# Patient Record
Sex: MALE | Race: WHITE | NOT HISPANIC OR LATINO | Employment: UNEMPLOYED | URBAN - METROPOLITAN AREA
[De-identification: names, ages, dates, MRNs, and addresses within clinical notes are randomized per-mention and may not be internally consistent; named-entity substitution may affect disease eponyms.]

---

## 2023-01-17 PROBLEM — R49.0 HOARSENESS OF VOICE: Status: ACTIVE | Noted: 2023-01-17

## 2023-01-17 PROBLEM — G47.00 INSOMNIA: Status: ACTIVE | Noted: 2023-01-17

## 2023-01-17 PROBLEM — J34.2 DEVIATED NASAL SEPTUM: Status: ACTIVE | Noted: 2023-01-17

## 2023-01-17 PROBLEM — M25.641 STIFFNESS OF FINGER JOINT OF RIGHT HAND: Status: ACTIVE | Noted: 2023-01-17

## 2023-01-17 PROBLEM — S52.501A CLOSED FRACTURE OF RIGHT DISTAL RADIUS: Status: ACTIVE | Noted: 2023-01-17

## 2023-01-17 PROBLEM — R31.9 HEMATURIA: Status: ACTIVE | Noted: 2023-01-17

## 2023-01-17 PROBLEM — M25.539 PAIN IN WRIST: Status: ACTIVE | Noted: 2023-01-17

## 2023-01-17 PROBLEM — I25.10 CAD (CORONARY ARTERY DISEASE): Status: ACTIVE | Noted: 2023-01-17

## 2023-01-17 PROBLEM — J38.3 VOCAL CORD CYST: Status: ACTIVE | Noted: 2023-01-17

## 2023-01-17 PROBLEM — N52.9 ERECTILE DYSFUNCTION: Status: ACTIVE | Noted: 2023-01-17

## 2023-01-17 PROBLEM — Z95.1 S/P CABG X 3: Status: ACTIVE | Noted: 2023-01-17

## 2023-01-17 PROBLEM — N40.1 BENIGN PROSTATIC HYPERPLASIA WITH LOWER URINARY TRACT SYMPTOMS: Status: ACTIVE | Noted: 2023-01-17

## 2023-01-17 PROBLEM — S92.151A CLOSED DISPLACED AVULSION FRACTURE OF RIGHT TALUS: Status: ACTIVE | Noted: 2023-01-17

## 2023-01-17 PROBLEM — M25.631 STIFFNESS OF RIGHT WRIST JOINT: Status: ACTIVE | Noted: 2023-01-17

## 2023-01-17 PROBLEM — J34.3 NASAL TURBINATE HYPERTROPHY: Status: ACTIVE | Noted: 2023-01-17

## 2023-01-17 PROBLEM — J38.1 LARYNGEAL POLYP: Status: ACTIVE | Noted: 2023-01-17

## 2023-01-17 PROBLEM — J34.89 NASAL OBSTRUCTION: Status: ACTIVE | Noted: 2023-01-17

## 2023-01-17 RX ORDER — SILDENAFIL 100 MG/1
100 TABLET, FILM COATED ORAL AS NEEDED
COMMUNITY
Start: 2021-07-19 | End: 2023-03-05 | Stop reason: WASHOUT

## 2023-01-17 RX ORDER — TAMSULOSIN HYDROCHLORIDE 0.4 MG/1
0.4 CAPSULE ORAL NIGHTLY
COMMUNITY
Start: 2021-10-06 | End: 2023-10-30 | Stop reason: ALTCHOICE

## 2023-01-17 RX ORDER — TADALAFIL 10 MG/1
10 TABLET ORAL ONCE AS NEEDED
COMMUNITY
Start: 2021-10-06

## 2023-01-17 RX ORDER — CLOBETASOL PROPIONATE 0.5 MG/G
CREAM TOPICAL ONCE AS NEEDED
COMMUNITY
Start: 2021-01-28 | End: 2023-10-30 | Stop reason: ALTCHOICE

## 2023-01-17 RX ORDER — FLUTICASONE PROPIONATE 50 MCG
2 SPRAY, SUSPENSION (ML) NASAL DAILY
COMMUNITY
Start: 2022-03-16 | End: 2023-05-30 | Stop reason: WASHOUT

## 2023-01-17 RX ORDER — LISINOPRIL 5 MG/1
1 TABLET ORAL DAILY
COMMUNITY
End: 2023-11-21 | Stop reason: SINTOL

## 2023-01-17 RX ORDER — ACETAMINOPHEN 325 MG/1
325 TABLET ORAL EVERY 6 HOURS
COMMUNITY
End: 2023-03-05 | Stop reason: WASHOUT

## 2023-01-17 RX ORDER — MELOXICAM 7.5 MG/1
7.5 TABLET ORAL
COMMUNITY
Start: 2021-09-20 | End: 2023-05-30 | Stop reason: WASHOUT

## 2023-01-17 RX ORDER — METOPROLOL TARTRATE 50 MG/1
1 TABLET ORAL DAILY
COMMUNITY
End: 2023-03-05 | Stop reason: WASHOUT

## 2023-01-17 RX ORDER — PANTOPRAZOLE SODIUM 40 MG/1
40 TABLET, DELAYED RELEASE ORAL DAILY
COMMUNITY
End: 2023-03-05 | Stop reason: SDUPTHER

## 2023-01-17 RX ORDER — TIZANIDINE 2 MG/1
TABLET ORAL DAILY PRN
COMMUNITY
Start: 2022-01-03 | End: 2023-03-05 | Stop reason: WASHOUT

## 2023-01-17 RX ORDER — TIOTROPIUM BROMIDE INHALATION SPRAY 3.12 UG/1
SPRAY, METERED RESPIRATORY (INHALATION)
COMMUNITY
End: 2023-10-30 | Stop reason: ALTCHOICE

## 2023-01-17 RX ORDER — ASPIRIN 81 MG/1
1 TABLET ORAL DAILY
COMMUNITY

## 2023-01-17 RX ORDER — CLOPIDOGREL BISULFATE 75 MG/1
1 TABLET ORAL DAILY
COMMUNITY
End: 2023-05-30 | Stop reason: WASHOUT

## 2023-01-17 RX ORDER — ATORVASTATIN CALCIUM 80 MG/1
1 TABLET, FILM COATED ORAL DAILY
COMMUNITY
End: 2023-03-05 | Stop reason: SDUPTHER

## 2023-01-17 RX ORDER — AZELASTINE 1 MG/ML
2 SPRAY, METERED NASAL 2 TIMES DAILY
COMMUNITY
Start: 2022-03-16 | End: 2023-06-02 | Stop reason: WASHOUT

## 2023-01-17 RX ORDER — BUPROPION HYDROCHLORIDE 150 MG/1
1 TABLET ORAL
COMMUNITY
Start: 2022-03-21 | End: 2023-05-30 | Stop reason: WASHOUT

## 2023-01-17 RX ORDER — METOPROLOL SUCCINATE 25 MG/1
1 TABLET, EXTENDED RELEASE ORAL DAILY
COMMUNITY
Start: 2021-04-21 | End: 2024-01-17

## 2023-02-08 PROBLEM — I10 BENIGN ESSENTIAL HYPERTENSION: Status: ACTIVE | Noted: 2020-03-20

## 2023-02-08 PROBLEM — E78.5 HYPERLIPIDEMIA: Status: ACTIVE | Noted: 2020-03-20

## 2023-02-08 PROBLEM — J43.9 PULMONARY EMPHYSEMA (MULTI): Status: ACTIVE | Noted: 2020-06-16

## 2023-02-08 PROBLEM — I25.10 ATHEROSCLEROSIS OF CORONARY ARTERY WITHOUT ANGINA PECTORIS: Status: ACTIVE | Noted: 2020-07-09

## 2023-02-08 PROBLEM — I25.10 CORONARY ATHEROSCLEROSIS: Status: ACTIVE | Noted: 2023-02-08

## 2023-02-08 PROBLEM — I47.29 PAROXYSMAL VENTRICULAR TACHYCARDIA (MULTI): Status: ACTIVE | Noted: 2020-06-16

## 2023-02-08 PROBLEM — R73.01 IMPAIRED FASTING GLUCOSE: Status: ACTIVE | Noted: 2021-10-11

## 2023-02-08 PROBLEM — L20.9 ATOPIC DERMATITIS: Status: ACTIVE | Noted: 2020-09-08

## 2023-02-08 PROBLEM — K21.9 GASTROESOPHAGEAL REFLUX DISEASE: Status: ACTIVE | Noted: 2022-01-03

## 2023-02-08 RX ORDER — ALBUTEROL SULFATE 90 UG/1
2 AEROSOL, METERED RESPIRATORY (INHALATION) EVERY 4 HOURS PRN
COMMUNITY
Start: 2020-06-16

## 2023-02-08 RX ORDER — APREMILAST 30 MG/1
1 TABLET, FILM COATED ORAL 2 TIMES DAILY
COMMUNITY
End: 2023-06-06 | Stop reason: WASHOUT

## 2023-02-26 ASSESSMENT — ENCOUNTER SYMPTOMS
PALPITATIONS: 0
SHORTNESS OF BREATH: 0

## 2023-02-26 NOTE — PROGRESS NOTES
Subjective   Stoney Snow is a 58 y.o. male and is here for recheck.   Patient presents for COPD, CAD, GERD, and hyperlipidemia.     COPD.  Using Spiriva but not consistently. No medication side effects noted.  Cough has improved since last visit. Emphysema seems to be doing well since not having any increased SOB. Hasn't needed to use albuterol recently.  Recent CXR was normal.  Cough from last visit improved. Still hasn't scheduled with the pulmonologist Dr Antonina pulido.       Hyperlipidemia: Well controlled. Taking and tolerating Atorvastatin 80mg qd.  LDL 57 on 2/28/23 labs.      IFG: A1c 6.1 on 2/28/23 labs.     HTN: Taking and tolerating Lisinopril and Metoprolol qd. BPs has been good.     CAD: sees Dr Clayton. Doing well. No CP or SOB.  V.tach has been controlled.  Sees Dr Clayton -- had appt  10/31/22 and is stable.  Recently wore a heart monitor for a week for some rare palpitations.     GERD:  Pantoprazole controlling reflux well. No abdominal pain or breakthrough reflux, unless he misses taking a pill.     ED: Taking and tolerating Cialis 10mg as needed from urologist. Works well.     Seeing dermatologist for psoriasis--using clobetasol as needed.   Otezla helping but not clearing the psoriasis-- may be switching to a different med.     Still not smoking.     Got COVID vaccine.      Also getting left thumb/forearm pain the past 3 weeks. Pops some.  Hurts to  objects.  NKI. Hasn't done anything for it.     Review of Systems   Respiratory:  Negative for shortness of breath.    Cardiovascular:  Negative for chest pain and palpitations.      Objective   Physical Exam  Vitals and nursing note reviewed.   Constitutional:       Appearance: Normal appearance. He is well-developed.   Eyes:      General: No scleral icterus.  Cardiovascular:      Rate and Rhythm: Normal rate and regular rhythm.      Heart sounds: No murmur heard.  Pulmonary:      Effort: Pulmonary effort is normal.      Breath sounds: Normal  breath sounds.   Abdominal:      Palpations: Abdomen is soft. There is no mass.      Tenderness: There is no abdominal tenderness.   Musculoskeletal:      Left forearm: Tenderness present.      Cervical back: Neck supple.      Comments: Flexion of left thumb causes pain to radiate up into forearm.    Skin:     General: Skin is warm and dry.   Neurological:      Mental Status: He is alert.       Assessment/Plan      1. Benign essential hypertension    2. Hyperlipidemia, unspecified hyperlipidemia type    3. Coronary artery disease involving native coronary artery of native heart without angina pectoris    4. Pulmonary emphysema, unspecified emphysema type (CMS/HCC)    5. Impaired fasting glucose    6. Gastroesophageal reflux disease without esophagitis    7. Ventricular tachycardia    8. Erectile dysfunction, unspecified erectile dysfunction type    9. De Quervain's tenosynovitis, left             1. Discussed diet and exercise. Encouraged weight loss.    2. Use Diclofenac twice per day with food for thumb/wrist issue. Ice/heat. Wear thumb spica splint/brace.  May need to see hand ortho if persists.   3. Discussed the patient's BMI with him.  The BMI 30.66.  4. Refilled meds.  Use Spiriva.   5. Follow up in 3 months with fasting labs the week before.    IV discontinued, cath removed intact

## 2023-02-28 LAB
ALANINE AMINOTRANSFERASE (SGPT) (U/L) IN SER/PLAS: 18 U/L (ref 10–52)
ALBUMIN (G/DL) IN SER/PLAS: 4.3 G/DL (ref 3.4–5)
ALKALINE PHOSPHATASE (U/L) IN SER/PLAS: 74 U/L (ref 33–120)
ANION GAP IN SER/PLAS: 15 MMOL/L (ref 10–20)
ASPARTATE AMINOTRANSFERASE (SGOT) (U/L) IN SER/PLAS: 16 U/L (ref 9–39)
BASOPHILS (10*3/UL) IN BLOOD BY AUTOMATED COUNT: 0.09 X10E9/L (ref 0–0.1)
BASOPHILS/100 LEUKOCYTES IN BLOOD BY AUTOMATED COUNT: 1.1 % (ref 0–2)
BILIRUBIN TOTAL (MG/DL) IN SER/PLAS: 2 MG/DL (ref 0–1.2)
CALCIUM (MG/DL) IN SER/PLAS: 9.3 MG/DL (ref 8.6–10.3)
CARBON DIOXIDE, TOTAL (MMOL/L) IN SER/PLAS: 27 MMOL/L (ref 21–32)
CHLORIDE (MMOL/L) IN SER/PLAS: 98 MMOL/L (ref 98–107)
CHOLESTEROL (MG/DL) IN SER/PLAS: 142 MG/DL (ref 0–199)
CHOLESTEROL IN HDL (MG/DL) IN SER/PLAS: 31 MG/DL
CHOLESTEROL/HDL RATIO: 4.6
CREATININE (MG/DL) IN SER/PLAS: 1.34 MG/DL (ref 0.5–1.3)
EOSINOPHILS (10*3/UL) IN BLOOD BY AUTOMATED COUNT: 0.6 X10E9/L (ref 0–0.7)
EOSINOPHILS/100 LEUKOCYTES IN BLOOD BY AUTOMATED COUNT: 7.4 % (ref 0–6)
ERYTHROCYTE DISTRIBUTION WIDTH (RATIO) BY AUTOMATED COUNT: 13.5 % (ref 11.5–14.5)
ERYTHROCYTE MEAN CORPUSCULAR HEMOGLOBIN CONCENTRATION (G/DL) BY AUTOMATED: 33.4 G/DL (ref 32–36)
ERYTHROCYTE MEAN CORPUSCULAR VOLUME (FL) BY AUTOMATED COUNT: 87 FL (ref 80–100)
ERYTHROCYTES (10*6/UL) IN BLOOD BY AUTOMATED COUNT: 5.82 X10E12/L (ref 4.5–5.9)
ESTIMATED AVERAGE GLUCOSE FOR HBA1C: 128 MG/DL
GFR MALE: 61 ML/MIN/1.73M2
GLUCOSE (MG/DL) IN SER/PLAS: 127 MG/DL (ref 74–99)
HEMATOCRIT (%) IN BLOOD BY AUTOMATED COUNT: 50.6 % (ref 41–52)
HEMOGLOBIN (G/DL) IN BLOOD: 16.9 G/DL (ref 13.5–17.5)
HEMOGLOBIN A1C/HEMOGLOBIN TOTAL IN BLOOD: 6.1 %
IMMATURE GRANULOCYTES/100 LEUKOCYTES IN BLOOD BY AUTOMATED COUNT: 0.5 % (ref 0–0.9)
LDL: 57 MG/DL (ref 0–99)
LEUKOCYTES (10*3/UL) IN BLOOD BY AUTOMATED COUNT: 8.1 X10E9/L (ref 4.4–11.3)
LYMPHOCYTES (10*3/UL) IN BLOOD BY AUTOMATED COUNT: 2.63 X10E9/L (ref 1.2–4.8)
LYMPHOCYTES/100 LEUKOCYTES IN BLOOD BY AUTOMATED COUNT: 32.4 % (ref 13–44)
MONOCYTES (10*3/UL) IN BLOOD BY AUTOMATED COUNT: 0.61 X10E9/L (ref 0.1–1)
MONOCYTES/100 LEUKOCYTES IN BLOOD BY AUTOMATED COUNT: 7.5 % (ref 2–10)
NEUTROPHILS (10*3/UL) IN BLOOD BY AUTOMATED COUNT: 4.15 X10E9/L (ref 1.2–7.7)
NEUTROPHILS/100 LEUKOCYTES IN BLOOD BY AUTOMATED COUNT: 51.1 % (ref 40–80)
NON HDL CHOLESTEROL: 111 MG/DL
PLATELETS (10*3/UL) IN BLOOD AUTOMATED COUNT: 299 X10E9/L (ref 150–450)
POTASSIUM (MMOL/L) IN SER/PLAS: 4 MMOL/L (ref 3.5–5.3)
PROTEIN TOTAL: 7.5 G/DL (ref 6.4–8.2)
SODIUM (MMOL/L) IN SER/PLAS: 136 MMOL/L (ref 136–145)
TRIGLYCERIDE (MG/DL) IN SER/PLAS: 271 MG/DL (ref 0–149)
UREA NITROGEN (MG/DL) IN SER/PLAS: 16 MG/DL (ref 6–23)
VLDL: 54 MG/DL (ref 0–40)

## 2023-03-06 ENCOUNTER — OFFICE VISIT (OUTPATIENT)
Dept: PRIMARY CARE | Facility: CLINIC | Age: 58
End: 2023-03-06
Payer: COMMERCIAL

## 2023-03-06 VITALS
WEIGHT: 242 LBS | HEART RATE: 82 BPM | TEMPERATURE: 98 F | OXYGEN SATURATION: 97 % | BODY MASS INDEX: 30.09 KG/M2 | SYSTOLIC BLOOD PRESSURE: 122 MMHG | DIASTOLIC BLOOD PRESSURE: 78 MMHG | HEIGHT: 75 IN

## 2023-03-06 DIAGNOSIS — I47.20 VENTRICULAR TACHYCARDIA (MULTI): ICD-10-CM

## 2023-03-06 DIAGNOSIS — K21.9 GASTROESOPHAGEAL REFLUX DISEASE WITHOUT ESOPHAGITIS: ICD-10-CM

## 2023-03-06 DIAGNOSIS — R73.01 IMPAIRED FASTING GLUCOSE: ICD-10-CM

## 2023-03-06 DIAGNOSIS — M65.4 DE QUERVAIN'S TENOSYNOVITIS, LEFT: ICD-10-CM

## 2023-03-06 DIAGNOSIS — J43.9 PULMONARY EMPHYSEMA, UNSPECIFIED EMPHYSEMA TYPE (MULTI): ICD-10-CM

## 2023-03-06 DIAGNOSIS — I25.10 CORONARY ARTERY DISEASE INVOLVING NATIVE CORONARY ARTERY OF NATIVE HEART WITHOUT ANGINA PECTORIS: ICD-10-CM

## 2023-03-06 DIAGNOSIS — I10 BENIGN ESSENTIAL HYPERTENSION: Primary | ICD-10-CM

## 2023-03-06 DIAGNOSIS — N52.9 ERECTILE DYSFUNCTION, UNSPECIFIED ERECTILE DYSFUNCTION TYPE: ICD-10-CM

## 2023-03-06 DIAGNOSIS — E78.5 HYPERLIPIDEMIA, UNSPECIFIED HYPERLIPIDEMIA TYPE: ICD-10-CM

## 2023-03-06 PROCEDURE — 3078F DIAST BP <80 MM HG: CPT | Performed by: PHYSICIAN ASSISTANT

## 2023-03-06 PROCEDURE — 99214 OFFICE O/P EST MOD 30 MIN: CPT | Performed by: PHYSICIAN ASSISTANT

## 2023-03-06 PROCEDURE — 3074F SYST BP LT 130 MM HG: CPT | Performed by: PHYSICIAN ASSISTANT

## 2023-03-06 PROCEDURE — 1036F TOBACCO NON-USER: CPT | Performed by: PHYSICIAN ASSISTANT

## 2023-03-06 RX ORDER — PANTOPRAZOLE SODIUM 40 MG/1
40 TABLET, DELAYED RELEASE ORAL DAILY
Qty: 90 TABLET | Refills: 1 | Status: SHIPPED | OUTPATIENT
Start: 2023-03-06 | End: 2023-04-12 | Stop reason: SDUPTHER

## 2023-03-06 RX ORDER — ATORVASTATIN CALCIUM 80 MG/1
80 TABLET, FILM COATED ORAL DAILY
Qty: 90 TABLET | Refills: 1 | Status: SHIPPED | OUTPATIENT
Start: 2023-03-06 | End: 2023-04-12 | Stop reason: SDUPTHER

## 2023-03-06 RX ORDER — DICLOFENAC SODIUM 75 MG/1
75 TABLET, DELAYED RELEASE ORAL 2 TIMES DAILY PRN
Qty: 20 TABLET | Refills: 0 | Status: SHIPPED | OUTPATIENT
Start: 2023-03-06 | End: 2023-06-06 | Stop reason: WASHOUT

## 2023-03-06 NOTE — PATIENT INSTRUCTIONS
Work on diet and exercise. Encourage weight loss.    Use Diclofenac twice per day with food for thumb/wrist issue. Ice/heat. Wear thumb spica splint/brace.    Schedule with pulmonologist as previously directed.   Follow up in 3 months with fasting labs the week before.

## 2023-04-12 ENCOUNTER — TELEPHONE (OUTPATIENT)
Dept: PRIMARY CARE | Facility: CLINIC | Age: 58
End: 2023-04-12
Payer: COMMERCIAL

## 2023-04-12 DIAGNOSIS — M65.4 DE QUERVAIN'S TENOSYNOVITIS, LEFT: ICD-10-CM

## 2023-04-12 DIAGNOSIS — I25.10 CORONARY ARTERY DISEASE INVOLVING NATIVE CORONARY ARTERY OF NATIVE HEART WITHOUT ANGINA PECTORIS: ICD-10-CM

## 2023-04-12 DIAGNOSIS — K21.9 GASTROESOPHAGEAL REFLUX DISEASE WITHOUT ESOPHAGITIS: ICD-10-CM

## 2023-04-12 DIAGNOSIS — E78.5 HYPERLIPIDEMIA, UNSPECIFIED HYPERLIPIDEMIA TYPE: ICD-10-CM

## 2023-04-12 RX ORDER — DICLOFENAC SODIUM 75 MG/1
75 TABLET, DELAYED RELEASE ORAL 2 TIMES DAILY PRN
Qty: 20 TABLET | Refills: 0 | Status: CANCELLED | OUTPATIENT
Start: 2023-04-12

## 2023-04-12 NOTE — TELEPHONE ENCOUNTER
[facility] sent his medicine to [facility]. They need sent to [facility]. Pantoprazole,Atorvastatin,Diclofenac. Is aware [facility] is not in the office today.

## 2023-04-13 RX ORDER — ATORVASTATIN CALCIUM 80 MG/1
80 TABLET, FILM COATED ORAL DAILY
Qty: 90 TABLET | Refills: 1 | Status: SHIPPED | OUTPATIENT
Start: 2023-04-13 | End: 2023-06-06 | Stop reason: SDUPTHER

## 2023-04-13 RX ORDER — PANTOPRAZOLE SODIUM 40 MG/1
40 TABLET, DELAYED RELEASE ORAL DAILY
Qty: 90 TABLET | Refills: 1 | Status: SHIPPED | OUTPATIENT
Start: 2023-04-13 | End: 2023-06-06 | Stop reason: SDUPTHER

## 2023-05-22 LAB
ALANINE AMINOTRANSFERASE (SGPT) (U/L) IN SER/PLAS: 12 U/L (ref 10–52)
ALBUMIN (G/DL) IN SER/PLAS: 4.2 G/DL (ref 3.4–5)
ALKALINE PHOSPHATASE (U/L) IN SER/PLAS: 68 U/L (ref 33–120)
ANION GAP IN SER/PLAS: 12 MMOL/L (ref 10–20)
ASPARTATE AMINOTRANSFERASE (SGOT) (U/L) IN SER/PLAS: 13 U/L (ref 9–39)
BASOPHILS (10*3/UL) IN BLOOD BY AUTOMATED COUNT: 0.05 X10E9/L (ref 0–0.1)
BASOPHILS/100 LEUKOCYTES IN BLOOD BY AUTOMATED COUNT: 0.8 % (ref 0–2)
BILIRUBIN TOTAL (MG/DL) IN SER/PLAS: 1.8 MG/DL (ref 0–1.2)
CALCIUM (MG/DL) IN SER/PLAS: 9.6 MG/DL (ref 8.6–10.3)
CARBON DIOXIDE, TOTAL (MMOL/L) IN SER/PLAS: 26 MMOL/L (ref 21–32)
CHLORIDE (MMOL/L) IN SER/PLAS: 105 MMOL/L (ref 98–107)
CREATININE (MG/DL) IN SER/PLAS: 1.06 MG/DL (ref 0.5–1.3)
EOSINOPHILS (10*3/UL) IN BLOOD BY AUTOMATED COUNT: 0.44 X10E9/L (ref 0–0.7)
EOSINOPHILS/100 LEUKOCYTES IN BLOOD BY AUTOMATED COUNT: 6.9 % (ref 0–6)
ERYTHROCYTE DISTRIBUTION WIDTH (RATIO) BY AUTOMATED COUNT: 13 % (ref 11.5–14.5)
ERYTHROCYTE MEAN CORPUSCULAR HEMOGLOBIN CONCENTRATION (G/DL) BY AUTOMATED: 33.7 G/DL (ref 32–36)
ERYTHROCYTE MEAN CORPUSCULAR VOLUME (FL) BY AUTOMATED COUNT: 87 FL (ref 80–100)
ERYTHROCYTES (10*6/UL) IN BLOOD BY AUTOMATED COUNT: 5.61 X10E12/L (ref 4.5–5.9)
GFR MALE: 81 ML/MIN/1.73M2
GLUCOSE (MG/DL) IN SER/PLAS: 101 MG/DL (ref 74–99)
HEMATOCRIT (%) IN BLOOD BY AUTOMATED COUNT: 49 % (ref 41–52)
HEMOGLOBIN (G/DL) IN BLOOD: 16.5 G/DL (ref 13.5–17.5)
IMMATURE GRANULOCYTES/100 LEUKOCYTES IN BLOOD BY AUTOMATED COUNT: 0.3 % (ref 0–0.9)
LEUKOCYTES (10*3/UL) IN BLOOD BY AUTOMATED COUNT: 6.4 X10E9/L (ref 4.4–11.3)
LYMPHOCYTES (10*3/UL) IN BLOOD BY AUTOMATED COUNT: 2.26 X10E9/L (ref 1.2–4.8)
LYMPHOCYTES/100 LEUKOCYTES IN BLOOD BY AUTOMATED COUNT: 35.5 % (ref 13–44)
MONOCYTES (10*3/UL) IN BLOOD BY AUTOMATED COUNT: 0.47 X10E9/L (ref 0.1–1)
MONOCYTES/100 LEUKOCYTES IN BLOOD BY AUTOMATED COUNT: 7.4 % (ref 2–10)
NEUTROPHILS (10*3/UL) IN BLOOD BY AUTOMATED COUNT: 3.12 X10E9/L (ref 1.2–7.7)
NEUTROPHILS/100 LEUKOCYTES IN BLOOD BY AUTOMATED COUNT: 49.1 % (ref 40–80)
PLATELETS (10*3/UL) IN BLOOD AUTOMATED COUNT: 218 X10E9/L (ref 150–450)
POTASSIUM (MMOL/L) IN SER/PLAS: 4.2 MMOL/L (ref 3.5–5.3)
PROTEIN TOTAL: 7.2 G/DL (ref 6.4–8.2)
SODIUM (MMOL/L) IN SER/PLAS: 139 MMOL/L (ref 136–145)
UREA NITROGEN (MG/DL) IN SER/PLAS: 16 MG/DL (ref 6–23)

## 2023-05-23 LAB
HEPATITIS C VIRUS AB PRESENCE IN SERUM: NONREACTIVE
HIV 1/ 2 AG/AB SCREEN: NONREACTIVE

## 2023-05-24 LAB
HEPATITIS BE ANTIBODY: NEGATIVE
HEPATITIS BE ANTIGEN: NEGATIVE

## 2023-05-25 LAB
NIL(NEG) CONTROL SPOT COUNT: NORMAL
PANEL A SPOT COUNT: 0
PANEL B SPOT COUNT: 0
POS CONTROL SPOT COUNT: NORMAL
T-SPOT. TB INTERPRETATION: NEGATIVE

## 2023-06-02 ENCOUNTER — LAB (OUTPATIENT)
Dept: LAB | Facility: LAB | Age: 58
End: 2023-06-02
Payer: COMMERCIAL

## 2023-06-02 DIAGNOSIS — E78.5 HYPERLIPIDEMIA, UNSPECIFIED HYPERLIPIDEMIA TYPE: ICD-10-CM

## 2023-06-02 DIAGNOSIS — I10 BENIGN ESSENTIAL HYPERTENSION: ICD-10-CM

## 2023-06-02 DIAGNOSIS — R73.01 IMPAIRED FASTING GLUCOSE: ICD-10-CM

## 2023-06-02 LAB
ALANINE AMINOTRANSFERASE (SGPT) (U/L) IN SER/PLAS: 16 U/L (ref 10–52)
ALBUMIN (G/DL) IN SER/PLAS: 3.9 G/DL (ref 3.4–5)
ALKALINE PHOSPHATASE (U/L) IN SER/PLAS: 62 U/L (ref 33–120)
ANION GAP IN SER/PLAS: 10 MMOL/L (ref 10–20)
ASPARTATE AMINOTRANSFERASE (SGOT) (U/L) IN SER/PLAS: 14 U/L (ref 9–39)
BILIRUBIN TOTAL (MG/DL) IN SER/PLAS: 1.4 MG/DL (ref 0–1.2)
CALCIUM (MG/DL) IN SER/PLAS: 9.5 MG/DL (ref 8.6–10.3)
CARBON DIOXIDE, TOTAL (MMOL/L) IN SER/PLAS: 28 MMOL/L (ref 21–32)
CHLORIDE (MMOL/L) IN SER/PLAS: 106 MMOL/L (ref 98–107)
CHOLESTEROL (MG/DL) IN SER/PLAS: 113 MG/DL (ref 0–199)
CHOLESTEROL IN HDL (MG/DL) IN SER/PLAS: 30.7 MG/DL
CHOLESTEROL/HDL RATIO: 3.7
CREATININE (MG/DL) IN SER/PLAS: 1.24 MG/DL (ref 0.5–1.3)
ESTIMATED AVERAGE GLUCOSE FOR HBA1C: 128 MG/DL
GFR MALE: 67 ML/MIN/1.73M2
GLUCOSE (MG/DL) IN SER/PLAS: 104 MG/DL (ref 74–99)
HEMOGLOBIN A1C/HEMOGLOBIN TOTAL IN BLOOD: 6.1 %
LDL: 44 MG/DL (ref 0–99)
POTASSIUM (MMOL/L) IN SER/PLAS: 4.8 MMOL/L (ref 3.5–5.3)
PROTEIN TOTAL: 6.5 G/DL (ref 6.4–8.2)
SODIUM (MMOL/L) IN SER/PLAS: 139 MMOL/L (ref 136–145)
TRIGLYCERIDE (MG/DL) IN SER/PLAS: 193 MG/DL (ref 0–149)
UREA NITROGEN (MG/DL) IN SER/PLAS: 11 MG/DL (ref 6–23)
VLDL: 39 MG/DL (ref 0–40)

## 2023-06-02 PROCEDURE — 80053 COMPREHEN METABOLIC PANEL: CPT

## 2023-06-02 PROCEDURE — 36415 COLL VENOUS BLD VENIPUNCTURE: CPT

## 2023-06-02 PROCEDURE — 80061 LIPID PANEL: CPT

## 2023-06-02 PROCEDURE — 83036 HEMOGLOBIN GLYCOSYLATED A1C: CPT

## 2023-06-06 ENCOUNTER — OFFICE VISIT (OUTPATIENT)
Dept: PRIMARY CARE | Facility: CLINIC | Age: 58
End: 2023-06-06
Payer: COMMERCIAL

## 2023-06-06 VITALS
TEMPERATURE: 97.9 F | SYSTOLIC BLOOD PRESSURE: 110 MMHG | OXYGEN SATURATION: 96 % | DIASTOLIC BLOOD PRESSURE: 72 MMHG | BODY MASS INDEX: 29.59 KG/M2 | WEIGHT: 238 LBS | HEIGHT: 75 IN | HEART RATE: 73 BPM

## 2023-06-06 DIAGNOSIS — I10 BENIGN ESSENTIAL HYPERTENSION: Primary | ICD-10-CM

## 2023-06-06 DIAGNOSIS — I25.10 CORONARY ARTERY DISEASE INVOLVING NATIVE CORONARY ARTERY OF NATIVE HEART WITHOUT ANGINA PECTORIS: ICD-10-CM

## 2023-06-06 DIAGNOSIS — R73.01 IMPAIRED FASTING GLUCOSE: ICD-10-CM

## 2023-06-06 DIAGNOSIS — E78.5 HYPERLIPIDEMIA, UNSPECIFIED HYPERLIPIDEMIA TYPE: ICD-10-CM

## 2023-06-06 DIAGNOSIS — I47.20 VENTRICULAR TACHYCARDIA (MULTI): ICD-10-CM

## 2023-06-06 DIAGNOSIS — J43.9 PULMONARY EMPHYSEMA, UNSPECIFIED EMPHYSEMA TYPE (MULTI): ICD-10-CM

## 2023-06-06 DIAGNOSIS — N52.9 ERECTILE DYSFUNCTION, UNSPECIFIED ERECTILE DYSFUNCTION TYPE: ICD-10-CM

## 2023-06-06 DIAGNOSIS — K21.9 GASTROESOPHAGEAL REFLUX DISEASE WITHOUT ESOPHAGITIS: ICD-10-CM

## 2023-06-06 PROCEDURE — 99214 OFFICE O/P EST MOD 30 MIN: CPT | Performed by: PHYSICIAN ASSISTANT

## 2023-06-06 PROCEDURE — 1036F TOBACCO NON-USER: CPT | Performed by: PHYSICIAN ASSISTANT

## 2023-06-06 PROCEDURE — 3078F DIAST BP <80 MM HG: CPT | Performed by: PHYSICIAN ASSISTANT

## 2023-06-06 PROCEDURE — 3074F SYST BP LT 130 MM HG: CPT | Performed by: PHYSICIAN ASSISTANT

## 2023-06-06 RX ORDER — RISANKIZUMAB-RZAA 150 MG/ML
INJECTION SUBCUTANEOUS
COMMUNITY
Start: 2023-06-01

## 2023-06-06 RX ORDER — ATORVASTATIN CALCIUM 80 MG/1
80 TABLET, FILM COATED ORAL DAILY
Qty: 90 TABLET | Refills: 1 | Status: SHIPPED | OUTPATIENT
Start: 2023-06-06 | End: 2023-09-11 | Stop reason: SDUPTHER

## 2023-06-06 RX ORDER — PANTOPRAZOLE SODIUM 40 MG/1
40 TABLET, DELAYED RELEASE ORAL DAILY
Qty: 90 TABLET | Refills: 1 | Status: SHIPPED | OUTPATIENT
Start: 2023-06-06 | End: 2023-09-11 | Stop reason: SDUPTHER

## 2023-06-06 ASSESSMENT — ENCOUNTER SYMPTOMS
ABDOMINAL PAIN: 0
SHORTNESS OF BREATH: 0
PALPITATIONS: 0

## 2023-06-06 NOTE — PROGRESS NOTES
Subjective   Patient ID: Stoney Snow is a 58 y.o. male who presents for Hypertension (Recheck), Coronary Artery Disease (Review BW), GERD, and Insomnia.    HPI   Patient presents for COPD, CAD, GERD, and hyperlipidemia.      COPD.  Has Spiriva but has not needed to use it recently since his lungs have been doing well.  Emphysema seems to be doing well since not having any increased SOB. Hasn't needed to use albuterol recently.  Still hasn't scheduled with the pulmonologist Dr Sarkar yet since feeling fine.         Hyperlipidemia: Well controlled. Taking and tolerating Atorvastatin 80mg qd.  Eating less red meat recently.   LDL (mg/dL)   Date Value   06/02/2023 44   02/28/2023 57     Triglycerides (mg/dL)   Date Value   06/02/2023 193 (H)   02/28/2023 271 (H)   09/19/2022 224 (H)     HDL (mg/dL)   Date Value   06/02/2023 30.7 (A)   02/28/2023 31.0 (A)        IFG: stable  Hemoglobin A1C (%)   Date Value   06/02/2023 6.1 (A)   02/28/2023 6.1 (A)     Glucose (mg/dL)   Date Value   06/02/2023 104 (H)   05/22/2023 101 (H)        HTN: Taking and tolerating Lisinopril and Metoprolol qd. BPs has been good.      CAD: sees Dr Clayton. Doing well. No CP or SOB.  V.tach has been controlled.  Sees Dr Clayton -- had appt  10/31/22 and is stable.   Seen yearly.     GERD:  Pantoprazole controlling reflux well. No abdominal pain or breakthrough reflux, unless he misses taking a pill.      ED: Taking and tolerating Cialis 10mg as needed from urologist. Works well.      Seeing dermatologist for psoriasis--using clobetasol as needed.   Stopped Otezla since not working.  Going to switch to Skyrizi later this week.      Still not smoking.      Got COVID vaccine.     Not sleeping well recently. Has some grandkids that they are caring for and they have been sleeping in his bed which is disrupting his sleep. Doesn't snore and no witnessed apnea.      Review of Systems   Respiratory:  Negative for shortness of breath.    Cardiovascular:   "Negative for chest pain and palpitations.   Gastrointestinal:  Negative for abdominal pain.       Objective   /72   Pulse 73   Temp 36.6 °C (97.9 °F) (Temporal)   Ht 1.905 m (6' 3\")   Wt 108 kg (238 lb)   SpO2 96%   BMI 29.75 kg/m²     Physical Exam  HENT:      Head: Normocephalic.   Eyes:      General: No scleral icterus.  Cardiovascular:      Rate and Rhythm: Normal rate and regular rhythm.   Pulmonary:      Effort: Pulmonary effort is normal.      Breath sounds: Normal breath sounds.   Abdominal:      Palpations: Abdomen is soft. There is no mass.      Tenderness: There is no abdominal tenderness.   Skin:     General: Skin is warm and dry.   Neurological:      Mental Status: He is alert.   Psychiatric:         Mood and Affect: Affect normal.         Assessment/Plan   Diagnoses and all orders for this visit:  Benign essential hypertension  -     Comprehensive Metabolic Panel; Future  Hyperlipidemia, unspecified hyperlipidemia type  -     Lipid Panel; Future  -     Comprehensive Metabolic Panel; Future  -     atorvastatin (Lipitor) 80 mg tablet; Take 1 tablet (80 mg) by mouth once daily.  Coronary artery disease involving native coronary artery of native heart without angina pectoris  -     atorvastatin (Lipitor) 80 mg tablet; Take 1 tablet (80 mg) by mouth once daily.  Gastroesophageal reflux disease without esophagitis  -     pantoprazole (ProtoNix) 40 mg EC tablet; Take 1 tablet (40 mg) by mouth once daily.  Impaired fasting glucose  -     Hemoglobin A1C; Future  Pulmonary emphysema, unspecified emphysema type (CMS/HCC)  Ventricular tachycardia (CMS/HCC)  Erectile dysfunction, unspecified erectile dysfunction type       Reviewed labs.   Discussed diet and exercise, and encouraged weight loss.   Refilled meds.   Can try taking melatonin to help with sleeping.   Follow up in 3 months for recheck of HTN, hyperlipidemia, CAD, COPD with fasting labs the week before.     "

## 2023-07-18 ENCOUNTER — OFFICE VISIT (OUTPATIENT)
Dept: PRIMARY CARE | Facility: CLINIC | Age: 58
End: 2023-07-18
Payer: COMMERCIAL

## 2023-07-18 VITALS
BODY MASS INDEX: 29.5 KG/M2 | HEART RATE: 102 BPM | TEMPERATURE: 97.3 F | SYSTOLIC BLOOD PRESSURE: 122 MMHG | WEIGHT: 236 LBS | OXYGEN SATURATION: 95 % | DIASTOLIC BLOOD PRESSURE: 74 MMHG

## 2023-07-18 DIAGNOSIS — H66.90 ACUTE OTITIS MEDIA, UNSPECIFIED OTITIS MEDIA TYPE: Primary | ICD-10-CM

## 2023-07-18 PROCEDURE — 1036F TOBACCO NON-USER: CPT | Performed by: PHYSICIAN ASSISTANT

## 2023-07-18 PROCEDURE — 3078F DIAST BP <80 MM HG: CPT | Performed by: PHYSICIAN ASSISTANT

## 2023-07-18 PROCEDURE — 99214 OFFICE O/P EST MOD 30 MIN: CPT | Performed by: PHYSICIAN ASSISTANT

## 2023-07-18 PROCEDURE — 3074F SYST BP LT 130 MM HG: CPT | Performed by: PHYSICIAN ASSISTANT

## 2023-07-18 RX ORDER — AMOXICILLIN 500 MG/1
1000 CAPSULE ORAL 2 TIMES DAILY
Qty: 40 CAPSULE | Refills: 0 | Status: SHIPPED | OUTPATIENT
Start: 2023-07-18 | End: 2023-07-28

## 2023-07-18 RX ORDER — FLUTICASONE PROPIONATE 50 MCG
2 SPRAY, SUSPENSION (ML) NASAL DAILY
Qty: 16 G | Refills: 0 | Status: SHIPPED | OUTPATIENT
Start: 2023-07-18 | End: 2023-09-11 | Stop reason: SDUPTHER

## 2023-07-18 ASSESSMENT — ENCOUNTER SYMPTOMS
DIZZINESS: 0
FEVER: 0
SORE THROAT: 0

## 2023-07-18 NOTE — PROGRESS NOTES
Subjective   Patient ID: Stoney Snow is a 58 y.o. male who presents for Earache (R ear ), Tinnitus (bilat), and Headache (Pain on top and back of head x 1 day   NKI).    HPI   Patient complains of right ear pain starting yesterday. It is popping and ringing at times.  Pain radiating to side of his head.  Gets some ringing in ears at times. No fevers. No nasal congestion. Applying warm compresses helps a little.  Symptoms aren't worsening.      reports that he quit smoking about 2 years ago. His smoking use included cigarettes. He started smoking about 40 years ago. He smoked an average of 2 packs per day. He has never used smokeless tobacco.     Review of Systems   Constitutional:  Negative for fever.   HENT:  Negative for congestion and sore throat.    Neurological:  Negative for dizziness.       Objective   /74   Pulse 102   Temp 36.3 °C (97.3 °F)   Wt 107 kg (236 lb)   SpO2 95%   BMI 29.50 kg/m²     Physical Exam  Vitals and nursing note reviewed.   Constitutional:       General: He is not in acute distress.     Appearance: He is not ill-appearing.   HENT:      Right Ear: Ear canal normal.      Left Ear: Tympanic membrane and ear canal normal.      Ears:      Comments: Right TM erythematous and bulging.      Nose: Nose normal.      Mouth/Throat:      Mouth: Mucous membranes are moist.      Pharynx: Oropharynx is clear.   Eyes:      General: No scleral icterus.  Cardiovascular:      Rate and Rhythm: Normal rate and regular rhythm.   Pulmonary:      Effort: Pulmonary effort is normal.      Breath sounds: Normal breath sounds.   Musculoskeletal:      Cervical back: Neck supple.   Lymphadenopathy:      Cervical: No cervical adenopathy.   Neurological:      Mental Status: He is alert.         Assessment/Plan   Diagnoses and all orders for this visit:  Acute otitis media, unspecified otitis media type  -     amoxicillin (Amoxil) 500 mg capsule; Take 2 capsules (1,000 mg) by mouth 2 times a day for 10  days.  -     fluticasone (Flonase) 50 mcg/actuation nasal spray; Administer 2 sprays into each nostril once daily.       Rx amoxicillin.   Rx Flonase.   Use tylenol prn. Do warm compresses.   Follow up if symptoms increase or persist.

## 2023-09-11 ENCOUNTER — OFFICE VISIT (OUTPATIENT)
Dept: PRIMARY CARE | Facility: CLINIC | Age: 58
End: 2023-09-11
Payer: COMMERCIAL

## 2023-09-11 ENCOUNTER — LAB (OUTPATIENT)
Dept: LAB | Facility: LAB | Age: 58
End: 2023-09-11
Payer: COMMERCIAL

## 2023-09-11 VITALS
HEART RATE: 80 BPM | SYSTOLIC BLOOD PRESSURE: 110 MMHG | TEMPERATURE: 97.6 F | OXYGEN SATURATION: 96 % | BODY MASS INDEX: 30.25 KG/M2 | WEIGHT: 242 LBS | DIASTOLIC BLOOD PRESSURE: 76 MMHG

## 2023-09-11 DIAGNOSIS — K21.9 GASTROESOPHAGEAL REFLUX DISEASE WITHOUT ESOPHAGITIS: ICD-10-CM

## 2023-09-11 DIAGNOSIS — H66.90 ACUTE OTITIS MEDIA, UNSPECIFIED OTITIS MEDIA TYPE: ICD-10-CM

## 2023-09-11 DIAGNOSIS — N52.9 ERECTILE DYSFUNCTION, UNSPECIFIED ERECTILE DYSFUNCTION TYPE: ICD-10-CM

## 2023-09-11 DIAGNOSIS — R73.01 IMPAIRED FASTING GLUCOSE: ICD-10-CM

## 2023-09-11 DIAGNOSIS — R53.83 FATIGUE, UNSPECIFIED TYPE: ICD-10-CM

## 2023-09-11 DIAGNOSIS — I25.10 CORONARY ARTERY DISEASE INVOLVING NATIVE CORONARY ARTERY OF NATIVE HEART WITHOUT ANGINA PECTORIS: ICD-10-CM

## 2023-09-11 DIAGNOSIS — I10 BENIGN ESSENTIAL HYPERTENSION: Primary | ICD-10-CM

## 2023-09-11 DIAGNOSIS — E78.5 HYPERLIPIDEMIA, UNSPECIFIED HYPERLIPIDEMIA TYPE: ICD-10-CM

## 2023-09-11 DIAGNOSIS — Z23 ENCOUNTER FOR IMMUNIZATION: ICD-10-CM

## 2023-09-11 DIAGNOSIS — I47.20 VENTRICULAR TACHYCARDIA (MULTI): ICD-10-CM

## 2023-09-11 DIAGNOSIS — I10 BENIGN ESSENTIAL HYPERTENSION: ICD-10-CM

## 2023-09-11 DIAGNOSIS — J43.9 PULMONARY EMPHYSEMA, UNSPECIFIED EMPHYSEMA TYPE (MULTI): ICD-10-CM

## 2023-09-11 LAB
ALANINE AMINOTRANSFERASE (SGPT) (U/L) IN SER/PLAS: 17 U/L (ref 10–52)
ALBUMIN (G/DL) IN SER/PLAS: 4.1 G/DL (ref 3.4–5)
ALKALINE PHOSPHATASE (U/L) IN SER/PLAS: 61 U/L (ref 33–120)
ANION GAP IN SER/PLAS: 10 MMOL/L (ref 10–20)
ASPARTATE AMINOTRANSFERASE (SGOT) (U/L) IN SER/PLAS: 14 U/L (ref 9–39)
BASOPHILS (10*3/UL) IN BLOOD BY AUTOMATED COUNT: 0.07 X10E9/L (ref 0–0.1)
BASOPHILS/100 LEUKOCYTES IN BLOOD BY AUTOMATED COUNT: 1.1 % (ref 0–2)
BILIRUBIN TOTAL (MG/DL) IN SER/PLAS: 1.7 MG/DL (ref 0–1.2)
CALCIUM (MG/DL) IN SER/PLAS: 9.1 MG/DL (ref 8.6–10.3)
CARBON DIOXIDE, TOTAL (MMOL/L) IN SER/PLAS: 29 MMOL/L (ref 21–32)
CHLORIDE (MMOL/L) IN SER/PLAS: 105 MMOL/L (ref 98–107)
CHOLESTEROL (MG/DL) IN SER/PLAS: 136 MG/DL (ref 0–199)
CHOLESTEROL IN HDL (MG/DL) IN SER/PLAS: 33.7 MG/DL
CHOLESTEROL/HDL RATIO: 4
CREATININE (MG/DL) IN SER/PLAS: 1.14 MG/DL (ref 0.5–1.3)
EOSINOPHILS (10*3/UL) IN BLOOD BY AUTOMATED COUNT: 0.44 X10E9/L (ref 0–0.7)
EOSINOPHILS/100 LEUKOCYTES IN BLOOD BY AUTOMATED COUNT: 6.9 % (ref 0–6)
ERYTHROCYTE DISTRIBUTION WIDTH (RATIO) BY AUTOMATED COUNT: 13.2 % (ref 11.5–14.5)
ERYTHROCYTE MEAN CORPUSCULAR HEMOGLOBIN CONCENTRATION (G/DL) BY AUTOMATED: 33.6 G/DL (ref 32–36)
ERYTHROCYTE MEAN CORPUSCULAR VOLUME (FL) BY AUTOMATED COUNT: 89 FL (ref 80–100)
ERYTHROCYTES (10*6/UL) IN BLOOD BY AUTOMATED COUNT: 5.58 X10E12/L (ref 4.5–5.9)
ESTIMATED AVERAGE GLUCOSE FOR HBA1C: 134 MG/DL
GFR MALE: 74 ML/MIN/1.73M2
GLUCOSE (MG/DL) IN SER/PLAS: 109 MG/DL (ref 74–99)
HEMATOCRIT (%) IN BLOOD BY AUTOMATED COUNT: 49.4 % (ref 41–52)
HEMOGLOBIN (G/DL) IN BLOOD: 16.6 G/DL (ref 13.5–17.5)
HEMOGLOBIN A1C/HEMOGLOBIN TOTAL IN BLOOD: 6.3 %
IMMATURE GRANULOCYTES/100 LEUKOCYTES IN BLOOD BY AUTOMATED COUNT: 0.3 % (ref 0–0.9)
LDL: 52 MG/DL (ref 0–99)
LEUKOCYTES (10*3/UL) IN BLOOD BY AUTOMATED COUNT: 6.4 X10E9/L (ref 4.4–11.3)
LYMPHOCYTES (10*3/UL) IN BLOOD BY AUTOMATED COUNT: 2.16 X10E9/L (ref 1.2–4.8)
LYMPHOCYTES/100 LEUKOCYTES IN BLOOD BY AUTOMATED COUNT: 34 % (ref 13–44)
MONOCYTES (10*3/UL) IN BLOOD BY AUTOMATED COUNT: 0.59 X10E9/L (ref 0.1–1)
MONOCYTES/100 LEUKOCYTES IN BLOOD BY AUTOMATED COUNT: 9.3 % (ref 2–10)
NEUTROPHILS (10*3/UL) IN BLOOD BY AUTOMATED COUNT: 3.07 X10E9/L (ref 1.2–7.7)
NEUTROPHILS/100 LEUKOCYTES IN BLOOD BY AUTOMATED COUNT: 48.4 % (ref 40–80)
NON HDL CHOLESTEROL: 102 MG/DL
PLATELETS (10*3/UL) IN BLOOD AUTOMATED COUNT: 236 X10E9/L (ref 150–450)
POTASSIUM (MMOL/L) IN SER/PLAS: 4.5 MMOL/L (ref 3.5–5.3)
PROTEIN TOTAL: 6.9 G/DL (ref 6.4–8.2)
SODIUM (MMOL/L) IN SER/PLAS: 139 MMOL/L (ref 136–145)
THYROTROPIN (MIU/L) IN SER/PLAS BY DETECTION LIMIT <= 0.05 MIU/L: 2.03 MIU/L (ref 0.44–3.98)
TRIGLYCERIDE (MG/DL) IN SER/PLAS: 254 MG/DL (ref 0–149)
UREA NITROGEN (MG/DL) IN SER/PLAS: 17 MG/DL (ref 6–23)
VLDL: 51 MG/DL (ref 0–40)

## 2023-09-11 PROCEDURE — 85025 COMPLETE CBC W/AUTO DIFF WBC: CPT

## 2023-09-11 PROCEDURE — 99214 OFFICE O/P EST MOD 30 MIN: CPT | Performed by: PHYSICIAN ASSISTANT

## 2023-09-11 PROCEDURE — 83036 HEMOGLOBIN GLYCOSYLATED A1C: CPT

## 2023-09-11 PROCEDURE — 90686 IIV4 VACC NO PRSV 0.5 ML IM: CPT | Performed by: PHYSICIAN ASSISTANT

## 2023-09-11 PROCEDURE — 90471 IMMUNIZATION ADMIN: CPT | Performed by: PHYSICIAN ASSISTANT

## 2023-09-11 PROCEDURE — 3078F DIAST BP <80 MM HG: CPT | Performed by: PHYSICIAN ASSISTANT

## 2023-09-11 PROCEDURE — 1036F TOBACCO NON-USER: CPT | Performed by: PHYSICIAN ASSISTANT

## 2023-09-11 PROCEDURE — 84443 ASSAY THYROID STIM HORMONE: CPT

## 2023-09-11 PROCEDURE — 3074F SYST BP LT 130 MM HG: CPT | Performed by: PHYSICIAN ASSISTANT

## 2023-09-11 PROCEDURE — 80053 COMPREHEN METABOLIC PANEL: CPT

## 2023-09-11 PROCEDURE — 80061 LIPID PANEL: CPT

## 2023-09-11 PROCEDURE — 36415 COLL VENOUS BLD VENIPUNCTURE: CPT

## 2023-09-11 RX ORDER — PANTOPRAZOLE SODIUM 40 MG/1
40 TABLET, DELAYED RELEASE ORAL DAILY
Qty: 90 TABLET | Refills: 0 | Status: SHIPPED | OUTPATIENT
Start: 2023-09-11 | End: 2023-12-12 | Stop reason: SDUPTHER

## 2023-09-11 RX ORDER — ATORVASTATIN CALCIUM 80 MG/1
80 TABLET, FILM COATED ORAL DAILY
Qty: 90 TABLET | Refills: 0 | Status: SHIPPED | OUTPATIENT
Start: 2023-09-11 | End: 2023-12-12 | Stop reason: SDUPTHER

## 2023-09-11 RX ORDER — FLUTICASONE PROPIONATE 50 MCG
2 SPRAY, SUSPENSION (ML) NASAL DAILY
Qty: 48 G | Refills: 0 | Status: SHIPPED | OUTPATIENT
Start: 2023-09-11 | End: 2023-10-30 | Stop reason: ALTCHOICE

## 2023-09-11 ASSESSMENT — ENCOUNTER SYMPTOMS
PALPITATIONS: 0
SHORTNESS OF BREATH: 0
ABDOMINAL PAIN: 0

## 2023-09-11 NOTE — RESULT ENCOUNTER NOTE
Inform patient that his blood work showed that his sugar level is rising and he is getting very near being diabetic.  Needs to work hard on reducing carbs and sugars in his diet in order to try to help this.  Weight loss would help with that as well.  Cholesterol is doing mostly OK.  The additional blood tests that I looked at to evaluate his fatigue including his thyroid and blood count were both normal. No evidence of anemia.

## 2023-09-11 NOTE — PROGRESS NOTES
Subjective   Patient ID: Stoney Snow is a 58 y.o. male who presents for Hypertension (Recheck), Hyperlipidemia, COPD, and GERD.    HPI   Patient presents for COPD, CAD, GERD, and hyperlipidemia.      COPD:  Emphysema seems to be doing well since not having any increased SOB, but does have some chronic cough. Hasn't beening using his Spiriva this year since he feels he is doing fine without it and hasn't needed to use albuterol recently.  Still hasn't scheduled with the pulmonologist Dr Sarkar yet but is going to.       Hyperlipidemia: Well controlled. Taking and tolerating Atorvastatin 80mg qd.  Eating less red meat recently.  Didn't get labs for this visit but is fasting.   LDL (mg/dL)   Date Value   09/11/2023 52   06/02/2023 44     Triglycerides (mg/dL)   Date Value   09/11/2023 254 (H)   06/02/2023 193 (H)   02/28/2023 271 (H)     HDL (mg/dL)   Date Value   09/11/2023 33.7 (A)   06/02/2023 30.7 (A)        IFG: stable. Didn't get labs for this visit but is fasting today.  Hemoglobin A1C (%)   Date Value   09/11/2023 6.3 (A)   06/02/2023 6.1 (A)     Glucose (mg/dL)   Date Value   09/11/2023 109 (H)   06/02/2023 104 (H)        HTN: Taking and tolerating Lisinopril and Metoprolol qd. BPs has been good.      CAD: sees Dr Clayton. Doing well. No CP or SOB.  V.tach has been controlled.  Sees Dr Clayton -- has appt later this month. Seen yearly.     GERD:  Pantoprazole controlling reflux well. No abdominal pain or breakthrough reflux, unless he misses a dose.      ED: Taking and tolerating Cialis 10mg as needed from urologist. Works well.      Seeing dermatologist for psoriasis--using clobetasol as needed.   Switched to Skyrizi since last here.      Still not smoking.       Still tired a lot. Not sleeping well the past 6 months. Has some grandkids that they are caring for and they have been disrupting his sleep. Doesn't snore and no witnessed apnea.  Falls asleep ok but wakes up during the night and then has tough  time falling back asleep. Feels like he is tired most of the day. No CP or SOB.     Review of Systems   Respiratory:  Negative for shortness of breath.    Cardiovascular:  Negative for chest pain and palpitations.   Gastrointestinal:  Negative for abdominal pain.       Objective   /76   Pulse 80   Temp 36.4 °C (97.6 °F)   Wt 110 kg (242 lb)   SpO2 96%   BMI 30.25 kg/m²     Physical Exam  HENT:      Head: Normocephalic.   Eyes:      General: No scleral icterus.  Cardiovascular:      Rate and Rhythm: Normal rate and regular rhythm.   Pulmonary:      Effort: Pulmonary effort is normal.      Breath sounds: Normal breath sounds.   Abdominal:      Palpations: Abdomen is soft. There is no mass.      Tenderness: There is no abdominal tenderness.   Skin:     General: Skin is warm and dry.   Neurological:      Mental Status: He is alert.   Psychiatric:         Mood and Affect: Affect normal.         Assessment/Plan   Diagnoses and all orders for this visit:  Benign essential hypertension  -     CBC and Auto Differential; Future  -     Comprehensive Metabolic Panel; Future  -     TSH with reflex to Free T4 if abnormal; Future  -     Comprehensive Metabolic Panel; Future  Hyperlipidemia, unspecified hyperlipidemia type  -     atorvastatin (Lipitor) 80 mg tablet; Take 1 tablet (80 mg) by mouth once daily.  -     Comprehensive Metabolic Panel; Future  -     Lipid Panel; Future  -     TSH with reflex to Free T4 if abnormal; Future  -     Lipid Panel; Future  -     Comprehensive Metabolic Panel; Future  Coronary artery disease involving native coronary artery of native heart without angina pectoris  -     atorvastatin (Lipitor) 80 mg tablet; Take 1 tablet (80 mg) by mouth once daily.  Gastroesophageal reflux disease without esophagitis  -     pantoprazole (ProtoNix) 40 mg EC tablet; Take 1 tablet (40 mg) by mouth once daily.  Impaired fasting glucose  -     Comprehensive Metabolic Panel; Future  -     Hemoglobin A1C;  Future  -     Comprehensive Metabolic Panel; Future  -     Hemoglobin A1C; Future  Pulmonary emphysema, unspecified emphysema type (CMS/HCC)  Ventricular tachycardia (CMS/HCC)  Erectile dysfunction, unspecified erectile dysfunction type  Acute otitis media, unspecified otitis media type  -     fluticasone (Flonase) 50 mcg/actuation nasal spray; Administer 2 sprays into each nostril once daily.  Fatigue, unspecified type  -     CBC and Auto Differential; Future  -     TSH with reflex to Free T4 if abnormal; Future  Encounter for immunization  -     Flu vaccine (IIV4) age 6 months and greater, preservative free       Get fasting labs today, including CBC and TSH to evaluate for other causes of fatigue.  Explained that his metoprolol could be contributing to some of his fatigue and he will discuss this with Dr. Clayton when he sees him soon to see if this dose can be reduced.  Discussed diet and exercise, and encouraged weight loss.   Refilled meds.   Schedule with Dr Sarkar.  Encouraged to use Spiriva daily since that may help with some of the chronic cough that he gets and may even help with his energy level.  See cardiologist as scheduled.   If sleeping not improving can try low-dose melatonin to see if this helps him stay asleep.  If still struggling with this that may need to have sleep study to evaluate further.  Flu shot given.  Follow up in 3 months for recheck of HTN, hyperlipidemia, CAD, COPD with fasting labs the week before.

## 2023-09-12 ENCOUNTER — TELEPHONE (OUTPATIENT)
Dept: PRIMARY CARE | Facility: CLINIC | Age: 58
End: 2023-09-12

## 2023-10-30 ENCOUNTER — CLINICAL SUPPORT (OUTPATIENT)
Dept: CARDIOLOGY | Facility: CLINIC | Age: 58
End: 2023-10-30
Payer: COMMERCIAL

## 2023-10-30 ENCOUNTER — ANCILLARY PROCEDURE (OUTPATIENT)
Dept: CARDIOLOGY | Facility: CLINIC | Age: 58
End: 2023-10-30
Payer: COMMERCIAL

## 2023-10-30 VITALS
SYSTOLIC BLOOD PRESSURE: 115 MMHG | HEART RATE: 92 BPM | TEMPERATURE: 97.1 F | BODY MASS INDEX: 30.04 KG/M2 | DIASTOLIC BLOOD PRESSURE: 78 MMHG | HEIGHT: 75 IN | WEIGHT: 241.6 LBS

## 2023-10-30 DIAGNOSIS — I10 BENIGN ESSENTIAL HYPERTENSION: Primary | ICD-10-CM

## 2023-10-30 DIAGNOSIS — I25.810 ATHEROSCLEROSIS OF CORONARY ARTERY BYPASS GRAFT OF NATIVE HEART WITHOUT ANGINA PECTORIS: ICD-10-CM

## 2023-10-30 DIAGNOSIS — E78.2 MIXED HYPERLIPIDEMIA: ICD-10-CM

## 2023-10-30 DIAGNOSIS — I47.29 PAROXYSMAL VENTRICULAR TACHYCARDIA (MULTI): ICD-10-CM

## 2023-10-30 DIAGNOSIS — I25.10 CORONARY ARTERY DISEASE INVOLVING NATIVE CORONARY ARTERY OF NATIVE HEART WITHOUT ANGINA PECTORIS: ICD-10-CM

## 2023-10-30 PROCEDURE — 99214 OFFICE O/P EST MOD 30 MIN: CPT | Performed by: INTERNAL MEDICINE

## 2023-10-30 PROCEDURE — 93005 ELECTROCARDIOGRAM TRACING: CPT

## 2023-10-30 PROCEDURE — 1036F TOBACCO NON-USER: CPT | Performed by: INTERNAL MEDICINE

## 2023-10-30 PROCEDURE — 3078F DIAST BP <80 MM HG: CPT | Performed by: INTERNAL MEDICINE

## 2023-10-30 PROCEDURE — 3074F SYST BP LT 130 MM HG: CPT | Performed by: INTERNAL MEDICINE

## 2023-10-30 RX ORDER — CETIRIZINE HYDROCHLORIDE 10 MG/1
TABLET ORAL
COMMUNITY
Start: 2023-09-14

## 2023-10-30 RX ORDER — MONTELUKAST SODIUM 10 MG/1
TABLET ORAL
COMMUNITY
Start: 2023-09-14 | End: 2023-12-12 | Stop reason: WASHOUT

## 2023-10-30 RX ORDER — TIOTROPIUM BROMIDE AND OLODATEROL 3.124; 2.736 UG/1; UG/1
SPRAY, METERED RESPIRATORY (INHALATION)
COMMUNITY
Start: 2023-09-14 | End: 2023-11-21 | Stop reason: ALTCHOICE

## 2023-10-30 RX ORDER — FAMOTIDINE 40 MG/1
TABLET, FILM COATED ORAL
COMMUNITY
Start: 2023-09-14 | End: 2023-12-12 | Stop reason: WASHOUT

## 2023-10-30 NOTE — PROGRESS NOTES
"Chief Complaint:   Coronary Artery Disease     History Of Present Illness:    Stoney Snow is a 58 y.o. male presenting with coronary artery disease.  He had a history of NSVT and underwent coronary artery bypass surgery.  The patient is tolerating guideline-directed medical therapy with antiplatelet and statin medication and is compliant.  He feels fatigued x 6 mos. The patient is not having any anginal symptoms or dyspnea on exertion.  Feels like he is in a fog.    Review of Systems  All pertinent systems have been reviewed and are negative except for what is stated in the history of present illness.    All other systems have been reviewed and are negative and noncontributory to this patient's current ailments.   .     Last Recorded Vitals:  Visit Vitals  /78   Pulse 92   Temp 36.2 °C (97.1 °F)   Ht 1.905 m (6' 3\")   Wt 110 kg (241 lb 9.6 oz)   BMI 30.20 kg/m²   Smoking Status Former   BSA 2.41 m²        Past Medical History:  He has a past medical history of Atherosclerosis of coronary artery bypass graft of native heart without angina pectoris (10/30/2023).    Past Surgical History:  He has a past surgical history that includes Other surgical history (10/06/2021); Other surgical history (10/06/2021); Other surgical history (10/06/2021); and Other surgical history (10/06/2021).      Social History:  He reports that he quit smoking about 2 years ago. His smoking use included cigarettes. He started smoking about 40 years ago. He smoked an average of 2 packs per day. He has never used smokeless tobacco. No history on file for alcohol use and drug use.    Family History:  Family History   Problem Relation Name Age of Onset    Other (cardiac disorder) Mother      Coronary artery disease Mother      Hypertension Mother      Lung cancer Father      Coronary artery disease Father      Hypertension Father          Allergies:  Patient has no known allergies.    Outpatient Medications:  Current Outpatient " Medications   Medication Instructions    albuterol 90 mcg/actuation inhaler 2 puffs, inhalation, Every 4 hours PRN    aspirin 81 mg EC tablet 1 tablet, oral, Daily    atorvastatin (LIPITOR) 80 mg, oral, Daily    cetirizine (ZyrTEC) 10 mg tablet     famotidine (Pepcid) 40 mg tablet     lisinopril 5 mg tablet 1 tablet, oral, Daily    metoprolol succinate XL (Toprol-XL) 25 mg 24 hr tablet 1 tablet, oral, Daily    montelukast (Singulair) 10 mg tablet     pantoprazole (PROTONIX) 40 mg, oral, Daily    Skyrizi 150 mg/mL pen injector     Stiolto Respimat 2.5-2.5 mcg/actuation mist inhaler     tadalafil (CIALIS) 10 mg, oral, Once as needed, 1 HOUR BEFORE ACTIVITY AS NEEDED       Physical Exam:  Physical Exam  Vitals reviewed.   Constitutional:       General: He is not in acute distress.     Appearance: Normal appearance.   HENT:      Head: Normocephalic and atraumatic.      Nose: Nose normal.   Eyes:      Conjunctiva/sclera: Conjunctivae normal.   Cardiovascular:      Rate and Rhythm: Normal rate and regular rhythm.      Pulses: Normal pulses.      Heart sounds: No murmur heard.  Pulmonary:      Effort: Pulmonary effort is normal. No respiratory distress.      Breath sounds: Normal breath sounds. No wheezing, rhonchi or rales.   Abdominal:      General: Bowel sounds are normal. There is no distension.      Palpations: Abdomen is soft.      Tenderness: There is no abdominal tenderness.   Musculoskeletal:         General: No swelling.      Right lower leg: No edema.      Left lower leg: No edema.   Skin:     General: Skin is warm and dry.      Capillary Refill: Capillary refill takes less than 2 seconds.   Neurological:      General: No focal deficit present.      Mental Status: He is alert.   Psychiatric:         Mood and Affect: Mood normal.            Last Labs:  CBC -  Lab Results   Component Value Date    WBC 6.4 09/11/2023    HGB 16.6 09/11/2023    HCT 49.4 09/11/2023    MCV 89 09/11/2023     09/11/2023       CMP  -  Lab Results   Component Value Date    CALCIUM 9.1 09/11/2023    PHOS 2.8 06/29/2020    PROT 6.9 09/11/2023    ALBUMIN 4.1 09/11/2023    AST 14 09/11/2023    ALT 17 09/11/2023    ALKPHOS 61 09/11/2023    BILITOT 1.7 (H) 09/11/2023       LIPID PANEL -   Lab Results   Component Value Date    CHOL 136 09/11/2023    HDL 33.7 (A) 09/11/2023    CHHDL 4.0 09/11/2023    VLDL 51 (H) 09/11/2023    TRIG 254 (H) 09/11/2023    NHDL 102 09/11/2023       RENAL FUNCTION PANEL -   Lab Results   Component Value Date    K 4.5 09/11/2023    PHOS 2.8 06/29/2020       Lab Results   Component Value Date    HGBA1C 6.3 (A) 09/11/2023       Last Cardiology Tests:  ECG:  No results found for this or any previous visit from the past 1095 days.  ECG - Normal  Echo:  No echocardiogram results found for the past 12 months           Assessment/Plan   Benign essential hypertension  Well controlled.  Compliant with meds.  - Nuclear Stress Test; Future    Mixed hyperlipidemia  Hyperlipidemia: The patient's lipids are well controlled on chronic statin therapy and they are meeting their goal LDL cholesterol per the ACC/AHA guidelines.  The patient is compliant and tolerating statin therapy.    - Nuclear Stress Test; Future    Atherosclerosis of coronary artery bypass graft of native heart without angina pectoris  CAD: The patient's CAD, as detailed in the HPI, has been clinically stable, without any anginal symptoms or dyspnea.  He does have fatigue. Labs OK. The patient will continue treatment with guideline-directed medical therapy with antiplatelet and statin medications and will continue regular exercise and a heart healthy diet.        - Nuclear Stress Test; Future    5. Paroxysmal ventricular tachycardia (CMS/HCC)  No recurrence since CABG.  - Nuclear Stress Test; Future       Davian Clayton MD    Exclusive of any other services or procedures performed, I, Davian Clayton MD , spent 30 minutes in duration for this visit today.  This time consisted  of chart review, obtaining history, and/or performing the exam as documented above as well as documenting the clinical information for the encounter in the electronic record, discussing treatment options, plans, and/or goals with patient, family, and/or caregiver, refilling medications, updating the electronic record, ordering medicines, lab work, imaging, referrals, and/or procedures as documented above and communicating with other [facility] professionals. I have discussed the results of laboratory, radiology, and cardiology studies with the patient and their family/caregiver.     Consent (Spinal Accessory)/Introductory Paragraph: The rationale for Mohs was explained to the patient and consent was obtained. The risks, benefits and alternatives to therapy were discussed in detail. Specifically, the risks of damage to the spinal accessory nerve, infection, scarring, bleeding, prolonged wound healing, incomplete removal, allergy to anesthesia, and recurrence were addressed. Prior to the procedure, the treatment site was clearly identified and confirmed by the patient. All components of Universal Protocol/PAUSE Rule completed.

## 2023-10-31 LAB
ATRIAL RATE: 84 BPM
P AXIS: 62 DEGREES
P OFFSET: 213 MS
P ONSET: 156 MS
PR INTERVAL: 136 MS
Q ONSET: 224 MS
QRS COUNT: 14 BEATS
QRS DURATION: 90 MS
QT INTERVAL: 368 MS
QTC CALCULATION(BAZETT): 434 MS
QTC FREDERICIA: 411 MS
R AXIS: -7 DEGREES
T AXIS: 55 DEGREES
T OFFSET: 408 MS
VENTRICULAR RATE: 84 BPM

## 2023-11-14 ENCOUNTER — APPOINTMENT (OUTPATIENT)
Dept: RADIOLOGY | Facility: CLINIC | Age: 58
End: 2023-11-14
Payer: COMMERCIAL

## 2023-11-21 ENCOUNTER — HOSPITAL ENCOUNTER (OUTPATIENT)
Dept: RADIOLOGY | Facility: HOSPITAL | Age: 58
Discharge: HOME | End: 2023-11-21
Payer: COMMERCIAL

## 2023-11-21 ENCOUNTER — OFFICE VISIT (OUTPATIENT)
Dept: PULMONOLOGY | Facility: HOSPITAL | Age: 58
End: 2023-11-21
Payer: COMMERCIAL

## 2023-11-21 VITALS
BODY MASS INDEX: 30.18 KG/M2 | DIASTOLIC BLOOD PRESSURE: 81 MMHG | WEIGHT: 242.7 LBS | RESPIRATION RATE: 16 BRPM | HEIGHT: 75 IN | HEART RATE: 89 BPM | OXYGEN SATURATION: 97 % | TEMPERATURE: 97 F | SYSTOLIC BLOOD PRESSURE: 119 MMHG

## 2023-11-21 DIAGNOSIS — J44.9 CHRONIC OBSTRUCTIVE PULMONARY DISEASE, UNSPECIFIED COPD TYPE (MULTI): Primary | ICD-10-CM

## 2023-11-21 DIAGNOSIS — K21.9 GASTROESOPHAGEAL REFLUX DISEASE WITHOUT ESOPHAGITIS: ICD-10-CM

## 2023-11-21 DIAGNOSIS — G47.33 OSA (OBSTRUCTIVE SLEEP APNEA): ICD-10-CM

## 2023-11-21 DIAGNOSIS — J30.9 CHRONIC ALLERGIC RHINITIS: ICD-10-CM

## 2023-11-21 DIAGNOSIS — J44.9 CHRONIC OBSTRUCTIVE PULMONARY DISEASE, UNSPECIFIED COPD TYPE (MULTI): ICD-10-CM

## 2023-11-21 DIAGNOSIS — I10 BENIGN ESSENTIAL HYPERTENSION: ICD-10-CM

## 2023-11-21 PROCEDURE — 71046 X-RAY EXAM CHEST 2 VIEWS: CPT | Performed by: STUDENT IN AN ORGANIZED HEALTH CARE EDUCATION/TRAINING PROGRAM

## 2023-11-21 PROCEDURE — 71046 X-RAY EXAM CHEST 2 VIEWS: CPT | Mod: FY

## 2023-11-21 PROCEDURE — 3074F SYST BP LT 130 MM HG: CPT | Performed by: INTERNAL MEDICINE

## 2023-11-21 PROCEDURE — 3079F DIAST BP 80-89 MM HG: CPT | Performed by: INTERNAL MEDICINE

## 2023-11-21 PROCEDURE — 99214 OFFICE O/P EST MOD 30 MIN: CPT | Mod: 25 | Performed by: INTERNAL MEDICINE

## 2023-11-21 PROCEDURE — 1036F TOBACCO NON-USER: CPT | Performed by: INTERNAL MEDICINE

## 2023-11-21 PROCEDURE — 99214 OFFICE O/P EST MOD 30 MIN: CPT | Performed by: INTERNAL MEDICINE

## 2023-11-21 RX ORDER — BUDESONIDE, GLYCOPYRROLATE, AND FORMOTEROL FUMARATE 160; 9; 4.8 UG/1; UG/1; UG/1
2 AEROSOL, METERED RESPIRATORY (INHALATION)
Qty: 10.7 G | Refills: 3 | Status: SHIPPED | OUTPATIENT
Start: 2023-11-21 | End: 2024-01-02 | Stop reason: SDUPTHER

## 2023-11-21 RX ORDER — LOSARTAN POTASSIUM 25 MG/1
25 TABLET ORAL DAILY
Qty: 30 TABLET | Refills: 11 | Status: SHIPPED | OUTPATIENT
Start: 2023-11-21 | End: 2024-01-02 | Stop reason: SDUPTHER

## 2023-11-21 ASSESSMENT — ENCOUNTER SYMPTOMS
RHINORRHEA: 1
COUGH: 1
SHORTNESS OF BREATH: 1

## 2023-11-21 NOTE — PROGRESS NOTES
"Subjective   Patient ID: Stoney Snow is a 58 y.o. male who presents for Cough (Patient is here for a follow up visit. Patient says his breathing is still good but he still has his productive cough. Patient has not used his rescue inhaler yet. Patient does not have a cpap or oxygen. Patient is not a current smoker. Patient has no other concerns for today. ).  Cough  Associated symptoms include postnasal drip, rhinorrhea and shortness of breath.     Mr. Snow is referred here for evaluation of chronic cough. Patient states his breathing is good from day to day life. He denies any shortness of breath even with steps. Patient states he has a productive cough with clear white mucous. This worsened 1.5 years ago. Patient states it is worst when he lays down. Patient denies any oxygen or cpap use at home. Patient not a current smoker. Patient stopped smoking after CABG in June 2020. Patient smoked about 2-3 PPD for about 40 years as he started smoking at age of 15. Patient was exposed to second hand smoke growing up. Patient has 2 dogs and a cat at home. 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 He states his father had lung cancer. He states when he was smoking he used to have cough for last several years but it was dry and now it is associated with clear phlegm. 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 Stoddard who did surgery for him. He notes sinus drainage and has hoarseness of voice. He also has uncontrolled GERD despite taking PPI. He is on skyrizi for psoriasis since 2023. He is not taking Spiriva as prescribed.     He is here for follow up. He still has cough but denies SOB. He is taking Stiolto 2 puffs once a day but still cough is ongoing. He is taking montelukast, cetirizine and famotidine also. \"I am not coughing so much but cough is still there.\" He did complete HST due to EDS and fatigue which showed MIKE.    Review of Systems "   HENT:  Positive for postnasal drip and rhinorrhea.    Respiratory:  Positive for cough and shortness of breath.    All other systems reviewed and are negative.      Objective   Physical Exam  Vitals and nursing note reviewed.   Constitutional:       Appearance: Normal appearance.   HENT:      Head: Normocephalic.      Nose: Nose normal.      Mouth/Throat:      Pharynx: Oropharynx is clear.   Eyes:      Extraocular Movements: Extraocular movements intact.      Conjunctiva/sclera: Conjunctivae normal.      Pupils: Pupils are equal, round, and reactive to light.   Cardiovascular:      Rate and Rhythm: Normal rate and regular rhythm.      Pulses: Normal pulses.      Heart sounds: Normal heart sounds.   Pulmonary:      Effort: Pulmonary effort is normal.      Breath sounds: Normal breath sounds.   Abdominal:      General: Bowel sounds are normal.      Palpations: Abdomen is soft.   Musculoskeletal:         General: Normal range of motion.      Cervical back: Normal range of motion.   Skin:     General: Skin is warm.   Neurological:      General: No focal deficit present.      Mental Status: He is alert and oriented to person, place, and time. Mental status is at baseline.   Psychiatric:         Mood and Affect: Mood normal.         Behavior: Behavior normal.       Assessment/Plan   1. COPD   2. Allergic rhinitis due to pollen  3. GERD  4. Obstructive sleep apnea  5. Former cigarette smoker quit 2020  6. CAD  7. BMI 30.4  8. Peripheral eosinophilia     Plan:  Patient has symptoms of COPD with chronic bronchitis. He had borderline BDR at 37% in 2020 on PFTs in small airways and has peripheral eosinophilia. Cough variant asthma is not ruled out.      -PFTs with moderate obstruction FEV1 68%. Recommend take dual bronchodilation. On stiolto daily but will change to Breztri or Trelegy in order to add ICS for possible hyper-reactive cough  -Check RAST and AAT  -optimize rx for GERD and allergic rhinitis for cough as well.    -Continue cetirizine in AM and Montelukast HS  -Continue famotidine HS and PPI in AM.   -recommend LDCT chest asap again  -He did complete HST in Sep 2023 that showed KATHRIN 19.2 and supine KATHRIN 52.9, POX at 88% or less for 19 minutes. Discussed treatment options at length including discussion for weight control, side-lying position and treatment for nasal allergies/obstruction. Counseled at length regarding implications of untreated MIKE including cardiovascular, metabolic, neurologic morbidity and mortality.  -He is willing to start Auto PAP 4 to 14 cwp and order sent to [facility]  -Follows with Dr. Clayton for CAD. Recommend change lisinopril to ARBs to assess if any possible contribution of ACE I related cough.      Complete CXR, RAST, AAT today. LDCT chest asap. Start PAP therapy. Changing Stiolto to Breztri to assess therapeutic benefit of ICS on his persistent cough. Sent message to Dr. Davian Clayton today to consider changing Lisinopril to ARBs. Continue current Rx and follow up in March 2024.    Received message through Epic Chat from Dr. Davian Clayton that it is ok to change ACE I to ARBS and that we could to it today. I am stopping lisinopril and starting losartan. Staff is notifying patient.

## 2023-11-21 NOTE — PATIENT INSTRUCTIONS
1. Please complete CT chest as discussed.  2. Blood work and chest xray today.  3. Please take Breztri 2 puffs twice daily and stop Stiolto 2 puffs once daily in the morning.  5. Take montelukast 1 tablet at bedtime and take cetirizine daily in the evening.  6. Take famotidine 1 tablet daily at bedtime.   7. Please start using CPAP as soon as you receive it. Please contact us if you don't hear from [facility] in the next couple weeks.  8. Follow up with Dr. Sarkar in March 2024 or earlier if needed for any recurring respiratory issues.

## 2023-11-27 ENCOUNTER — APPOINTMENT (OUTPATIENT)
Dept: RADIOLOGY | Facility: HOSPITAL | Age: 58
End: 2023-11-27
Payer: COMMERCIAL

## 2023-11-27 ENCOUNTER — APPOINTMENT (OUTPATIENT)
Dept: CARDIOLOGY | Facility: HOSPITAL | Age: 58
End: 2023-11-27
Payer: COMMERCIAL

## 2023-11-29 ENCOUNTER — TELEPHONE (OUTPATIENT)
Dept: CARDIOLOGY | Facility: CLINIC | Age: 58
End: 2023-11-29
Payer: COMMERCIAL

## 2023-11-29 NOTE — TELEPHONE ENCOUNTER
Spoke with pt and went over stress prep     NPO 4 hrs prior  No caffeine 24 hrs prior  Hold metoprolol and cialis 24 hrs prior     Pt verbalized understanding

## 2023-12-04 ENCOUNTER — HOSPITAL ENCOUNTER (OUTPATIENT)
Dept: CARDIOLOGY | Facility: CLINIC | Age: 58
Discharge: HOME | End: 2023-12-04
Payer: COMMERCIAL

## 2023-12-04 VITALS
BODY MASS INDEX: 29.72 KG/M2 | DIASTOLIC BLOOD PRESSURE: 76 MMHG | HEART RATE: 78 BPM | HEIGHT: 75 IN | SYSTOLIC BLOOD PRESSURE: 124 MMHG | WEIGHT: 239 LBS

## 2023-12-04 DIAGNOSIS — I25.10 CORONARY ARTERY DISEASE INVOLVING NATIVE CORONARY ARTERY OF NATIVE HEART WITHOUT ANGINA PECTORIS: ICD-10-CM

## 2023-12-04 DIAGNOSIS — I10 BENIGN ESSENTIAL HYPERTENSION: ICD-10-CM

## 2023-12-04 DIAGNOSIS — E78.2 MIXED HYPERLIPIDEMIA: ICD-10-CM

## 2023-12-04 DIAGNOSIS — I25.810 ATHEROSCLEROSIS OF CORONARY ARTERY BYPASS GRAFT OF NATIVE HEART WITHOUT ANGINA PECTORIS: ICD-10-CM

## 2023-12-04 DIAGNOSIS — I47.29 PAROXYSMAL VENTRICULAR TACHYCARDIA (MULTI): ICD-10-CM

## 2023-12-04 PROCEDURE — A9502 TC99M TETROFOSMIN: HCPCS | Performed by: INTERNAL MEDICINE

## 2023-12-04 PROCEDURE — 93016 CV STRESS TEST SUPVJ ONLY: CPT | Performed by: INTERNAL MEDICINE

## 2023-12-04 PROCEDURE — 78452 HT MUSCLE IMAGE SPECT MULT: CPT | Performed by: INTERNAL MEDICINE

## 2023-12-04 PROCEDURE — 93018 CV STRESS TEST I&R ONLY: CPT | Performed by: INTERNAL MEDICINE

## 2023-12-04 PROCEDURE — 3430000001 HC RX 343 DIAGNOSTIC RADIOPHARMACEUTICALS: Performed by: INTERNAL MEDICINE

## 2023-12-04 PROCEDURE — 78452 HT MUSCLE IMAGE SPECT MULT: CPT

## 2023-12-04 RX ADMIN — TETROFOSMIN 10 MILLICURIE: 0.23 INJECTION, POWDER, LYOPHILIZED, FOR SOLUTION INTRAVENOUS at 12:16

## 2023-12-04 RX ADMIN — TETROFOSMIN 30 MILLICURIE: 0.23 INJECTION, POWDER, LYOPHILIZED, FOR SOLUTION INTRAVENOUS at 13:32

## 2023-12-07 ENCOUNTER — LAB (OUTPATIENT)
Dept: LAB | Facility: LAB | Age: 58
End: 2023-12-07
Payer: COMMERCIAL

## 2023-12-07 DIAGNOSIS — E78.5 HYPERLIPIDEMIA, UNSPECIFIED HYPERLIPIDEMIA TYPE: ICD-10-CM

## 2023-12-07 DIAGNOSIS — R73.01 IMPAIRED FASTING GLUCOSE: ICD-10-CM

## 2023-12-07 DIAGNOSIS — J30.9 CHRONIC ALLERGIC RHINITIS: ICD-10-CM

## 2023-12-07 DIAGNOSIS — J44.9 CHRONIC OBSTRUCTIVE PULMONARY DISEASE, UNSPECIFIED COPD TYPE (MULTI): ICD-10-CM

## 2023-12-07 DIAGNOSIS — I10 BENIGN ESSENTIAL HYPERTENSION: ICD-10-CM

## 2023-12-07 LAB
ALBUMIN SERPL BCP-MCNC: 4.1 G/DL (ref 3.4–5)
ALP SERPL-CCNC: 64 U/L (ref 33–120)
ALT SERPL W P-5'-P-CCNC: 19 U/L (ref 10–52)
ANION GAP SERPL CALC-SCNC: 8 MMOL/L (ref 10–20)
AST SERPL W P-5'-P-CCNC: 16 U/L (ref 9–39)
BILIRUB SERPL-MCNC: 1.7 MG/DL (ref 0–1.2)
BUN SERPL-MCNC: 11 MG/DL (ref 6–23)
CALCIUM SERPL-MCNC: 9.1 MG/DL (ref 8.6–10.3)
CHLORIDE SERPL-SCNC: 104 MMOL/L (ref 98–107)
CHOLEST SERPL-MCNC: 149 MG/DL (ref 0–199)
CHOLESTEROL/HDL RATIO: 5.3
CO2 SERPL-SCNC: 31 MMOL/L (ref 21–32)
CREAT SERPL-MCNC: 1.07 MG/DL (ref 0.5–1.3)
GFR SERPL CREATININE-BSD FRML MDRD: 80 ML/MIN/1.73M*2
GLUCOSE SERPL-MCNC: 107 MG/DL (ref 74–99)
HDLC SERPL-MCNC: 28.2 MG/DL
LDLC SERPL CALC-MCNC: 58 MG/DL
NON HDL CHOLESTEROL: 121 MG/DL (ref 0–149)
POTASSIUM SERPL-SCNC: 4.6 MMOL/L (ref 3.5–5.3)
PROT SERPL-MCNC: 6.6 G/DL (ref 6.4–8.2)
SODIUM SERPL-SCNC: 138 MMOL/L (ref 136–145)
TRIGL SERPL-MCNC: 313 MG/DL (ref 0–149)
VLDL: 63 MG/DL (ref 0–40)

## 2023-12-07 PROCEDURE — 80053 COMPREHEN METABOLIC PANEL: CPT

## 2023-12-07 PROCEDURE — 83036 HEMOGLOBIN GLYCOSYLATED A1C: CPT

## 2023-12-07 PROCEDURE — 82785 ASSAY OF IGE: CPT

## 2023-12-07 PROCEDURE — 80061 LIPID PANEL: CPT

## 2023-12-07 PROCEDURE — 82104 ALPHA-1-ANTITRYPSIN PHENO: CPT

## 2023-12-07 PROCEDURE — 36415 COLL VENOUS BLD VENIPUNCTURE: CPT

## 2023-12-07 PROCEDURE — 86003 ALLG SPEC IGE CRUDE XTRC EA: CPT

## 2023-12-08 LAB
A ALTERNATA IGE QN: <0.1 KU/L
A FUMIGATUS IGE QN: <0.1 KU/L
BERMUDA GRASS IGE QN: <0.1 KU/L
BOXELDER IGE QN: <0.1 KU/L
C HERBARUM IGE QN: <0.1 KU/L
CALIF WALNUT POLN IGE QN: <0.1 KU/L
CAT DANDER IGE QN: <0.1 KU/L
CMN PIGWEED IGE QN: <0.1 KU/L
COMMON RAGWEED IGE QN: <0.1 KU/L
COTTONWOOD IGE QN: <0.1 KU/L
D FARINAE IGE QN: <0.1 KU/L
D PTERONYSS IGE QN: <0.1 KU/L
DOG DANDER IGE QN: <0.1 KU/L
ENGL PLANTAIN IGE QN: <0.1 KU/L
EST. AVERAGE GLUCOSE BLD GHB EST-MCNC: 134 MG/DL
GOOSEFOOT IGE QN: <0.1 KU/L
HBA1C MFR BLD: 6.3 %
JOHNSON GRASS IGE QN: <0.1 KU/L
KENT BLUE GRASS IGE QN: <0.1 KU/L
LONDON PLANE IGE QN: <0.1 KU/L
MT JUNIPER IGE QN: <0.1 KU/L
P NOTATUM IGE QN: <0.1 KU/L
PECAN/HICK TREE IGE QN: <0.1 KU/L
ROACH IGE QN: <0.1 KU/L
SALTWORT IGE QN: <0.1 KU/L
SHEEP SORREL IGE QN: <0.1 KU/L
SILVER BIRCH IGE QN: <0.1 KU/L
TIMOTHY IGE QN: <0.1 KU/L
TOTAL IGE SMQN RAST: 90.2 KU/L
WHITE ASH IGE QN: <0.1 KU/L
WHITE ELM IGE QN: <0.1 KU/L
WHITE MULBERRY IGE QN: <0.1 KU/L
WHITE OAK IGE QN: <0.1 KU/L

## 2023-12-12 ENCOUNTER — OFFICE VISIT (OUTPATIENT)
Dept: PRIMARY CARE | Facility: CLINIC | Age: 58
End: 2023-12-12
Payer: COMMERCIAL

## 2023-12-12 VITALS
OXYGEN SATURATION: 97 % | WEIGHT: 243 LBS | HEART RATE: 86 BPM | TEMPERATURE: 97.8 F | BODY MASS INDEX: 30.37 KG/M2 | SYSTOLIC BLOOD PRESSURE: 124 MMHG | DIASTOLIC BLOOD PRESSURE: 86 MMHG

## 2023-12-12 DIAGNOSIS — J43.9 PULMONARY EMPHYSEMA, UNSPECIFIED EMPHYSEMA TYPE (MULTI): ICD-10-CM

## 2023-12-12 DIAGNOSIS — R73.01 IMPAIRED FASTING GLUCOSE: ICD-10-CM

## 2023-12-12 DIAGNOSIS — E78.5 HYPERLIPIDEMIA, UNSPECIFIED HYPERLIPIDEMIA TYPE: ICD-10-CM

## 2023-12-12 DIAGNOSIS — I10 BENIGN ESSENTIAL HYPERTENSION: Primary | ICD-10-CM

## 2023-12-12 DIAGNOSIS — K21.9 GASTROESOPHAGEAL REFLUX DISEASE WITHOUT ESOPHAGITIS: ICD-10-CM

## 2023-12-12 DIAGNOSIS — G47.33 OBSTRUCTIVE SLEEP APNEA: ICD-10-CM

## 2023-12-12 DIAGNOSIS — I25.10 CORONARY ARTERY DISEASE INVOLVING NATIVE CORONARY ARTERY OF NATIVE HEART WITHOUT ANGINA PECTORIS: ICD-10-CM

## 2023-12-12 DIAGNOSIS — I47.20 VENTRICULAR TACHYCARDIA (MULTI): ICD-10-CM

## 2023-12-12 DIAGNOSIS — N52.9 ERECTILE DYSFUNCTION, UNSPECIFIED ERECTILE DYSFUNCTION TYPE: ICD-10-CM

## 2023-12-12 PROBLEM — J32.9 CHRONIC SINUSITIS: Status: ACTIVE | Noted: 2023-12-12

## 2023-12-12 PROBLEM — J30.89 ALLERGIC RHINITIS DUE TO DUST: Status: ACTIVE | Noted: 2023-12-12

## 2023-12-12 LAB
A1AT PHENOTYP SERPL-IMP: NORMAL
A1AT SERPL-MCNC: 135 MG/DL (ref 90–200)

## 2023-12-12 PROCEDURE — 3074F SYST BP LT 130 MM HG: CPT | Performed by: PHYSICIAN ASSISTANT

## 2023-12-12 PROCEDURE — 99214 OFFICE O/P EST MOD 30 MIN: CPT | Performed by: PHYSICIAN ASSISTANT

## 2023-12-12 PROCEDURE — 1036F TOBACCO NON-USER: CPT | Performed by: PHYSICIAN ASSISTANT

## 2023-12-12 PROCEDURE — 3079F DIAST BP 80-89 MM HG: CPT | Performed by: PHYSICIAN ASSISTANT

## 2023-12-12 RX ORDER — PANTOPRAZOLE SODIUM 40 MG/1
40 TABLET, DELAYED RELEASE ORAL DAILY
Qty: 90 TABLET | Refills: 0 | Status: SHIPPED | OUTPATIENT
Start: 2023-12-12 | End: 2024-03-10 | Stop reason: SDUPTHER

## 2023-12-12 RX ORDER — ATORVASTATIN CALCIUM 80 MG/1
80 TABLET, FILM COATED ORAL DAILY
Qty: 90 TABLET | Refills: 0 | Status: SHIPPED | OUTPATIENT
Start: 2023-12-12 | End: 2024-03-11 | Stop reason: SINTOL

## 2023-12-12 RX ORDER — FLUTICASONE PROPIONATE 50 MCG
2 SPRAY, SUSPENSION (ML) NASAL DAILY
COMMUNITY
Start: 2023-11-17

## 2023-12-12 ASSESSMENT — ENCOUNTER SYMPTOMS
SHORTNESS OF BREATH: 0
PALPITATIONS: 0
ABDOMINAL PAIN: 0

## 2023-12-12 NOTE — PROGRESS NOTES
Subjective   Patient ID: Stoney Snow is a 58 y.o. male who presents for Hypertension and Hyperlipidemia (Review BW).    HPI   Patient presents for COPD, CAD, IFG, GERD, and hyperlipidemia.      COPD:  Emphysema seems to be doing well since not having any increased SOB, but does have some chronic cough. Switched to Breztri recently but hasn't started it yet.  Recently saw pulmonologist Dr Sarkar 11/21/23.       Hyperlipidemia: LDL controlled but trigs higher. Taking and tolerating Atorvastatin 80mg qd.  Eating less red meat recently.  Was getting a little muscle ache so stopped the medicine for a week and it seemed to help.   LDL Calculated (mg/dL)   Date Value   12/07/2023 58     LDL (mg/dL)   Date Value   09/11/2023 52   06/02/2023 44     Triglycerides (mg/dL)   Date Value   12/07/2023 313 (H)   09/11/2023 254 (H)   06/02/2023 193 (H)     HDL-Cholesterol (mg/dL)   Date Value   12/07/2023 28.2     HDL (mg/dL)   Date Value   09/11/2023 33.7 (A)   06/02/2023 30.7 (A)       IFG: stable.   Hemoglobin A1C (%)   Date Value   12/07/2023 6.3 (H)   09/11/2023 6.3 (A)   06/02/2023 6.1 (A)     Glucose (mg/dL)   Date Value   12/07/2023 107 (H)   09/11/2023 109 (H)   06/02/2023 104 (H)        HTN: Taking and tolerating Metoprolol qd. Pulmonologist switched from Lisinopril to Losartan 25mg every day to see if change helps chronic cough -- hasn't switched yet. BPs have been good.      CAD: sees Dr Clayton. Doing well. No CP or SOB.  V.tach has been controlled.  Sees Dr Clayton -- last appt 10/30/23.  Had NST 12/4/23 that was good.     GERD:  Pantoprazole controlling reflux well. No abdominal pain or breakthrough reflux, unless he misses a dose.      ED: Taking and tolerating Cialis 10mg as needed from urologist. Works well.      Seeing dermatologist for psoriasis--using clobetasol as needed.   Skyrizi working well.      Still not smoking.         Had sleep study that showed MIKE. Dr Sarkar started him on CPAP but hasn't gotten  equipment yet. Still tired a lot. Not sleeping well the past 9 months. Has some grandkids that they are caring for and they have been disrupting his sleep. No CP or SOB. States that fatigue did start about the time when he started the Skrizi.     Review of Systems   Respiratory:  Negative for shortness of breath.    Cardiovascular:  Negative for chest pain and palpitations.   Gastrointestinal:  Negative for abdominal pain.       Objective   /86   Pulse 86   Temp 36.6 °C (97.8 °F)   Wt 110 kg (243 lb)   SpO2 97%   BMI 30.37 kg/m²     Physical Exam  HENT:      Head: Normocephalic.   Eyes:      General: No scleral icterus.  Cardiovascular:      Rate and Rhythm: Normal rate and regular rhythm.   Pulmonary:      Effort: Pulmonary effort is normal.      Breath sounds: Normal breath sounds.   Abdominal:      Palpations: Abdomen is soft. There is no mass.      Tenderness: There is no abdominal tenderness.   Skin:     General: Skin is warm and dry.   Neurological:      Mental Status: He is alert.   Psychiatric:         Mood and Affect: Affect normal.         Assessment/Plan   Diagnoses and all orders for this visit:  Benign essential hypertension  -     Comprehensive Metabolic Panel; Future  Hyperlipidemia, unspecified hyperlipidemia type  -     atorvastatin (Lipitor) 80 mg tablet; Take 1 tablet (80 mg) by mouth once daily.  -     Lipid Panel; Future  -     Comprehensive Metabolic Panel; Future  Coronary artery disease involving native coronary artery of native heart without angina pectoris  -     atorvastatin (Lipitor) 80 mg tablet; Take 1 tablet (80 mg) by mouth once daily.  Gastroesophageal reflux disease without esophagitis  -     pantoprazole (ProtoNix) 40 mg EC tablet; Take 1 tablet (40 mg) by mouth once daily.  Impaired fasting glucose  -     Comprehensive Metabolic Panel; Future  -     Hemoglobin A1C; Future  Pulmonary emphysema, unspecified emphysema type (CMS/HCC)  Ventricular tachycardia  (CMS/[facility])  Erectile dysfunction, unspecified erectile dysfunction type  Obstructive sleep apnea         Discussed diet and exercise, and encouraged weight loss.   Refilled meds.   See specialists as scheduled.   Retry taking atorvastatin with co-Q-10.  Switch his inhaler and ok to switch from lisinopril to Losartan to see if changes chronic cough.   Get CPAP to help MIKE (discussed risks of MIKE).   If fatigue not improved, advised to discuss with dermatologist whether Skyrizi may be contributing to his fatigue.   Follow up in 3 months for recheck of HTN, hyperlipidemia, CAD, COPD with fasting labs the week before.

## 2024-01-02 DIAGNOSIS — J44.9 CHRONIC OBSTRUCTIVE PULMONARY DISEASE, UNSPECIFIED COPD TYPE (MULTI): ICD-10-CM

## 2024-01-02 DIAGNOSIS — I10 BENIGN ESSENTIAL HYPERTENSION: ICD-10-CM

## 2024-01-02 NOTE — TELEPHONE ENCOUNTER
Pt asking for losartan refill- On med list said pt isn't taking. If appropriate I pended it to be sent to [facility]

## 2024-01-03 DIAGNOSIS — J44.9 CHRONIC OBSTRUCTIVE PULMONARY DISEASE, UNSPECIFIED COPD TYPE (MULTI): ICD-10-CM

## 2024-01-03 RX ORDER — BUDESONIDE, GLYCOPYRROLATE, AND FORMOTEROL FUMARATE 160; 9; 4.8 UG/1; UG/1; UG/1
2 AEROSOL, METERED RESPIRATORY (INHALATION)
Qty: 72 G | Refills: 3 | Status: SHIPPED | OUTPATIENT
Start: 2024-01-03 | End: 2024-03-12 | Stop reason: SDUPTHER

## 2024-01-03 RX ORDER — LOSARTAN POTASSIUM 25 MG/1
25 TABLET ORAL DAILY
Qty: 90 TABLET | Refills: 2 | Status: SHIPPED | OUTPATIENT
Start: 2024-01-03 | End: 2024-03-12 | Stop reason: SDUPTHER

## 2024-01-03 RX ORDER — BUDESONIDE, GLYCOPYRROLATE, AND FORMOTEROL FUMARATE 160; 9; 4.8 UG/1; UG/1; UG/1
2 AEROSOL, METERED RESPIRATORY (INHALATION)
Qty: 10.7 G | Refills: 3 | Status: SHIPPED | OUTPATIENT
Start: 2024-01-03 | End: 2024-01-03 | Stop reason: SDUPTHER

## 2024-01-09 ENCOUNTER — TELEPHONE (OUTPATIENT)
Dept: CARDIOLOGY | Facility: CLINIC | Age: 59
End: 2024-01-09
Payer: COMMERCIAL

## 2024-01-09 NOTE — TELEPHONE ENCOUNTER
Pt called to verify whether or not he is supposed to be taking the losartan and the lisinopril. The losartan was not on his med list as of the 1/3/2024 message to you from [facility]. How should pt proceed? Please review and advise.

## 2024-01-10 NOTE — TELEPHONE ENCOUNTER
Called to review with patient that he should still be taking lisinopril. Pt states that Dr. Sarkar wanted to switch him from lisinopril to losartan. Pt instructed to call Dr. Sarkar's office and discuss with him and have him call Dr. Clayton to review patient's POC. Patient verbalized understanding.

## 2024-01-17 DIAGNOSIS — I10 BENIGN ESSENTIAL HYPERTENSION: Primary | ICD-10-CM

## 2024-01-17 RX ORDER — LISINOPRIL 5 MG/1
5 TABLET ORAL DAILY
Qty: 90 TABLET | Refills: 2 | Status: SHIPPED | OUTPATIENT
Start: 2024-01-17 | End: 2024-03-12 | Stop reason: WASHOUT

## 2024-01-17 RX ORDER — METOPROLOL SUCCINATE 25 MG/1
25 TABLET, EXTENDED RELEASE ORAL DAILY
Qty: 90 TABLET | Refills: 2 | Status: SHIPPED | OUTPATIENT
Start: 2024-01-17

## 2024-03-05 ENCOUNTER — LAB (OUTPATIENT)
Dept: LAB | Facility: LAB | Age: 59
End: 2024-03-05
Payer: COMMERCIAL

## 2024-03-05 DIAGNOSIS — I10 BENIGN ESSENTIAL HYPERTENSION: ICD-10-CM

## 2024-03-05 DIAGNOSIS — E78.5 HYPERLIPIDEMIA, UNSPECIFIED HYPERLIPIDEMIA TYPE: ICD-10-CM

## 2024-03-05 DIAGNOSIS — R73.01 IMPAIRED FASTING GLUCOSE: ICD-10-CM

## 2024-03-05 LAB
ALBUMIN SERPL BCP-MCNC: 4.2 G/DL (ref 3.4–5)
ALP SERPL-CCNC: 60 U/L (ref 33–120)
ALT SERPL W P-5'-P-CCNC: 19 U/L (ref 10–52)
ANION GAP SERPL CALC-SCNC: 11 MMOL/L (ref 10–20)
AST SERPL W P-5'-P-CCNC: 16 U/L (ref 9–39)
BILIRUB SERPL-MCNC: 1.7 MG/DL (ref 0–1.2)
BUN SERPL-MCNC: 18 MG/DL (ref 6–23)
CALCIUM SERPL-MCNC: 9.3 MG/DL (ref 8.6–10.3)
CHLORIDE SERPL-SCNC: 103 MMOL/L (ref 98–107)
CHOLEST SERPL-MCNC: 177 MG/DL (ref 0–199)
CHOLESTEROL/HDL RATIO: 5.6
CO2 SERPL-SCNC: 26 MMOL/L (ref 21–32)
CREAT SERPL-MCNC: 1.13 MG/DL (ref 0.5–1.3)
EGFRCR SERPLBLD CKD-EPI 2021: 75 ML/MIN/1.73M*2
GLUCOSE SERPL-MCNC: 144 MG/DL (ref 74–99)
HDLC SERPL-MCNC: 31.8 MG/DL
LDLC SERPL CALC-MCNC: ABNORMAL MG/DL
NON HDL CHOLESTEROL: 145 MG/DL (ref 0–149)
POTASSIUM SERPL-SCNC: 4.5 MMOL/L (ref 3.5–5.3)
PROT SERPL-MCNC: 7.2 G/DL (ref 6.4–8.2)
SODIUM SERPL-SCNC: 135 MMOL/L (ref 136–145)
TRIGL SERPL-MCNC: 490 MG/DL (ref 0–149)
VLDL: ABNORMAL

## 2024-03-05 PROCEDURE — 83036 HEMOGLOBIN GLYCOSYLATED A1C: CPT

## 2024-03-05 PROCEDURE — 36415 COLL VENOUS BLD VENIPUNCTURE: CPT

## 2024-03-05 PROCEDURE — 80061 LIPID PANEL: CPT

## 2024-03-05 PROCEDURE — 80053 COMPREHEN METABOLIC PANEL: CPT

## 2024-03-06 LAB
EST. AVERAGE GLUCOSE BLD GHB EST-MCNC: 137 MG/DL
HBA1C MFR BLD: 6.4 %

## 2024-03-10 RX ORDER — ATORVASTATIN CALCIUM 80 MG/1
80 TABLET, FILM COATED ORAL DAILY
Qty: 90 TABLET | Refills: 0 | Status: CANCELLED | OUTPATIENT
Start: 2024-03-10

## 2024-03-10 ASSESSMENT — ENCOUNTER SYMPTOMS
PALPITATIONS: 0
SHORTNESS OF BREATH: 0
ABDOMINAL PAIN: 0

## 2024-03-10 NOTE — PROGRESS NOTES
Subjective   Patient ID: Stoney Snow is a 59 y.o. male who presents for Hypertension, Hyperlipidemia, and Coronary Artery Disease (Recheck, review labs).    HPI   Patient presents for COPD, CAD, IFG, GERD, and hyperlipidemia.      COPD:  Emphysema seems to be doing well since not having any increased SOB, but does have some chronic cough. Still using Spiriva and hasn't started the Breztri that was prescribed by pulmonologist yet.   Saw pulmonologist Dr Sarkar 11/21/23  and has appt tomorrow 3/12/24. Had CT last week that was negative.      Hyperlipidemia: LDL unknown since trigs 490. Retried taking Atorvastatin 80mg every day with co-q-10 after last vitis but sill got aches, so re-stopped it about a month ago.   LDL Calculated   Date Value   03/05/2024      Comment:     The calculation of LDL and VLDL are inaccurate when the Triglycerides are greater than 400 mg/dL or when the patient is non-fasting. If LDL measurement is necessary contact the testing laboratory for an alternative LDL assay.                                  Near   Borderline      AGE      Desirable  Optimal    High     High     Very High     0-19 Y     0 - 109     ---    110-129   >/= 130     ----    20-24 Y     0 - 119     ---    120-159   >/= 160     ----      >24 Y     0 -  99   100-129  130-159   160-189     >/=190     12/07/2023 58 mg/dL     LDL (mg/dL)   Date Value   09/11/2023 52   06/02/2023 44     Triglycerides (mg/dL)   Date Value   03/05/2024 490 (H)   12/07/2023 313 (H)     HDL-Cholesterol (mg/dL)   Date Value   03/05/2024 31.8   12/07/2023 28.2       IFG: A1c hedy slightly. Eating more carbs lately.   Hemoglobin A1C (%)   Date Value   03/05/2024 6.4 (H)   12/07/2023 6.3 (H)     Glucose (mg/dL)   Date Value   03/05/2024 144 (H)   12/07/2023 107 (H)      HTN: Taking and tolerating Metoprolol qd. Pulmonologist switched from Lisinopril to Losartan 25mg every day to see if change helps chronic cough -- hasn't switched yet since waiting  for approval from Dr Clayton.  BPs have been good.      CAD: sees Dr Clayton. Doing well. No CP or SOB.  V.tach has been controlled.  Sees Dr Clayton -- last appt 10/30/23.  Had NST 12/4/23 that was good.     GERD:  Pantoprazole controlling reflux well. No abdominal pain or breakthrough reflux, unless he misses a dose.      ED: Taking and tolerating Cialis 10mg as needed from urologist. Works well.      Psoriasis: Seeing dermatologist for psoriasis--using clobetasol as needed.   Skyrizi working well.      MIKE: Had sleep study that showed MIKE. Dr Sarkar started him on CPAP but hasn't gotten equipment yet. Has appt tomorrow with Dr Sarkar. Still tired a lot. Not sleeping well the past 9 months. Has some grandkids that they are caring for and they have been disrupting his sleep.       Last colonoscopy 3/2021 showed tubular adenoma polyps with Dr Gardner -- due for repeat now.      reports that he quit smoking about 3 years ago. His smoking use included cigarettes. He started smoking about 41 years ago. He smoked an average of 2 packs per day. He has never used smokeless tobacco.    Review of Systems   Respiratory:  Negative for shortness of breath.    Cardiovascular:  Negative for chest pain and palpitations.   Gastrointestinal:  Negative for abdominal pain.       Objective   /70   Pulse 90   Temp 36.4 °C (97.6 °F)   Wt 112 kg (247 lb)   SpO2 96%   BMI 30.87 kg/m²     Physical Exam  HENT:      Head: Normocephalic.   Eyes:      General: No scleral icterus.  Cardiovascular:      Rate and Rhythm: Normal rate and regular rhythm.   Pulmonary:      Effort: Pulmonary effort is normal.      Breath sounds: Normal breath sounds.   Abdominal:      Palpations: Abdomen is soft. There is no mass.      Tenderness: There is no abdominal tenderness.   Skin:     General: Skin is warm and dry.   Neurological:      Mental Status: He is alert.   Psychiatric:         Mood and Affect: Affect normal.         Assessment/Plan   Diagnoses  and all orders for this visit:  Benign essential hypertension  Hyperlipidemia, unspecified hyperlipidemia type  -     Lipid Panel; Future  -     Comprehensive Metabolic Panel; Future  -     rosuvastatin (Crestor) 10 mg tablet; Take 1 tablet (10 mg) by mouth once daily.  Coronary artery disease involving native coronary artery of native heart without angina pectoris  -     rosuvastatin (Crestor) 10 mg tablet; Take 1 tablet (10 mg) by mouth once daily.  Gastroesophageal reflux disease without esophagitis  -     pantoprazole (ProtoNix) 40 mg EC tablet; Take 1 tablet (40 mg) by mouth once daily.  Impaired fasting glucose  Pulmonary emphysema, unspecified emphysema type (CMS/HCC)  Ventricular tachycardia (CMS/HCC)  Erectile dysfunction, unspecified erectile dysfunction type  Obstructive sleep apnea  Benign essential HTN  -     Comprehensive Metabolic Panel; Future  IFG (impaired fasting glucose)  -     Hemoglobin A1C; Future  Colon cancer screening  -     Referral to General Surgery; Future  Adenomatous polyp of colon, unspecified part of colon  -     Referral to General Surgery; Future         Discussed diet and exercise, and encouraged weight loss.   Refilled meds.   See specialists as scheduled.   Switch to Rx Rosuvastatin 10mg every day to help with uncontrolled hyperlipidemia.   Discussed rising sugars -- reduce carb intake.   Get CPAP to help MIKE (discussed risks of MIKE).   Referred back to Dr Gardner since due for repeat colonoscopy.   Follow up in 3 months for recheck of HTN, hyperlipidemia, CAD, COPD with fasting labs the week before.

## 2024-03-11 ENCOUNTER — OFFICE VISIT (OUTPATIENT)
Dept: PRIMARY CARE | Facility: CLINIC | Age: 59
End: 2024-03-11
Payer: COMMERCIAL

## 2024-03-11 VITALS
SYSTOLIC BLOOD PRESSURE: 114 MMHG | TEMPERATURE: 97.6 F | DIASTOLIC BLOOD PRESSURE: 70 MMHG | BODY MASS INDEX: 30.87 KG/M2 | OXYGEN SATURATION: 96 % | WEIGHT: 247 LBS | HEART RATE: 90 BPM

## 2024-03-11 DIAGNOSIS — E78.5 HYPERLIPIDEMIA, UNSPECIFIED HYPERLIPIDEMIA TYPE: ICD-10-CM

## 2024-03-11 DIAGNOSIS — K21.9 GASTROESOPHAGEAL REFLUX DISEASE WITHOUT ESOPHAGITIS: ICD-10-CM

## 2024-03-11 DIAGNOSIS — I10 BENIGN ESSENTIAL HTN: ICD-10-CM

## 2024-03-11 DIAGNOSIS — R73.01 IMPAIRED FASTING GLUCOSE: ICD-10-CM

## 2024-03-11 DIAGNOSIS — G47.33 OBSTRUCTIVE SLEEP APNEA: ICD-10-CM

## 2024-03-11 DIAGNOSIS — I25.10 CORONARY ARTERY DISEASE INVOLVING NATIVE CORONARY ARTERY OF NATIVE HEART WITHOUT ANGINA PECTORIS: ICD-10-CM

## 2024-03-11 DIAGNOSIS — Z12.11 COLON CANCER SCREENING: ICD-10-CM

## 2024-03-11 DIAGNOSIS — D12.6 ADENOMATOUS POLYP OF COLON, UNSPECIFIED PART OF COLON: ICD-10-CM

## 2024-03-11 DIAGNOSIS — R73.01 IFG (IMPAIRED FASTING GLUCOSE): ICD-10-CM

## 2024-03-11 DIAGNOSIS — I10 BENIGN ESSENTIAL HYPERTENSION: Primary | ICD-10-CM

## 2024-03-11 DIAGNOSIS — J43.9 PULMONARY EMPHYSEMA, UNSPECIFIED EMPHYSEMA TYPE (MULTI): ICD-10-CM

## 2024-03-11 DIAGNOSIS — N52.9 ERECTILE DYSFUNCTION, UNSPECIFIED ERECTILE DYSFUNCTION TYPE: ICD-10-CM

## 2024-03-11 DIAGNOSIS — I47.20 VENTRICULAR TACHYCARDIA (MULTI): ICD-10-CM

## 2024-03-11 PROCEDURE — 3074F SYST BP LT 130 MM HG: CPT | Performed by: PHYSICIAN ASSISTANT

## 2024-03-11 PROCEDURE — 3078F DIAST BP <80 MM HG: CPT | Performed by: PHYSICIAN ASSISTANT

## 2024-03-11 PROCEDURE — 99214 OFFICE O/P EST MOD 30 MIN: CPT | Performed by: PHYSICIAN ASSISTANT

## 2024-03-11 PROCEDURE — 1036F TOBACCO NON-USER: CPT | Performed by: PHYSICIAN ASSISTANT

## 2024-03-11 RX ORDER — PANTOPRAZOLE SODIUM 40 MG/1
40 TABLET, DELAYED RELEASE ORAL DAILY
Qty: 90 TABLET | Refills: 1 | Status: SHIPPED | OUTPATIENT
Start: 2024-03-11 | End: 2024-04-17 | Stop reason: SDUPTHER

## 2024-03-11 RX ORDER — ROSUVASTATIN CALCIUM 10 MG/1
10 TABLET, COATED ORAL DAILY
Qty: 90 TABLET | Refills: 1 | Status: SHIPPED | OUTPATIENT
Start: 2024-03-11

## 2024-03-12 ENCOUNTER — OFFICE VISIT (OUTPATIENT)
Dept: PULMONOLOGY | Facility: HOSPITAL | Age: 59
End: 2024-03-12
Payer: COMMERCIAL

## 2024-03-12 VITALS
BODY MASS INDEX: 31.6 KG/M2 | TEMPERATURE: 97.8 F | HEART RATE: 85 BPM | RESPIRATION RATE: 16 BRPM | WEIGHT: 246.2 LBS | OXYGEN SATURATION: 98 % | HEIGHT: 74 IN | DIASTOLIC BLOOD PRESSURE: 86 MMHG | SYSTOLIC BLOOD PRESSURE: 133 MMHG

## 2024-03-12 DIAGNOSIS — J44.9 CHRONIC OBSTRUCTIVE PULMONARY DISEASE, UNSPECIFIED COPD TYPE (MULTI): Primary | ICD-10-CM

## 2024-03-12 DIAGNOSIS — J30.9 CHRONIC ALLERGIC RHINITIS: ICD-10-CM

## 2024-03-12 DIAGNOSIS — Z87.891 FORMER CIGARETTE SMOKER: ICD-10-CM

## 2024-03-12 DIAGNOSIS — I10 BENIGN ESSENTIAL HYPERTENSION: ICD-10-CM

## 2024-03-12 DIAGNOSIS — G47.33 OSA (OBSTRUCTIVE SLEEP APNEA): ICD-10-CM

## 2024-03-12 DIAGNOSIS — K21.9 GASTROESOPHAGEAL REFLUX DISEASE WITHOUT ESOPHAGITIS: ICD-10-CM

## 2024-03-12 PROCEDURE — 1036F TOBACCO NON-USER: CPT | Performed by: INTERNAL MEDICINE

## 2024-03-12 PROCEDURE — 99214 OFFICE O/P EST MOD 30 MIN: CPT | Performed by: INTERNAL MEDICINE

## 2024-03-12 PROCEDURE — 3075F SYST BP GE 130 - 139MM HG: CPT | Performed by: INTERNAL MEDICINE

## 2024-03-12 PROCEDURE — 3079F DIAST BP 80-89 MM HG: CPT | Performed by: INTERNAL MEDICINE

## 2024-03-12 RX ORDER — BUDESONIDE, GLYCOPYRROLATE, AND FORMOTEROL FUMARATE 160; 9; 4.8 UG/1; UG/1; UG/1
2 AEROSOL, METERED RESPIRATORY (INHALATION)
Qty: 72 G | Refills: 3 | Status: SHIPPED | OUTPATIENT
Start: 2024-03-12 | End: 2025-03-07

## 2024-03-12 RX ORDER — LOSARTAN POTASSIUM 25 MG/1
25 TABLET ORAL DAILY
Qty: 90 TABLET | Refills: 2 | Status: SHIPPED | OUTPATIENT
Start: 2024-03-12 | End: 2025-03-12

## 2024-03-12 ASSESSMENT — ENCOUNTER SYMPTOMS
RHINORRHEA: 1
LOSS OF SENSATION IN FEET: 0
DEPRESSION: 0
OCCASIONAL FEELINGS OF UNSTEADINESS: 0
SHORTNESS OF BREATH: 1
COUGH: 1

## 2024-03-12 ASSESSMENT — COLUMBIA-SUICIDE SEVERITY RATING SCALE - C-SSRS
6. HAVE YOU EVER DONE ANYTHING, STARTED TO DO ANYTHING, OR PREPARED TO DO ANYTHING TO END YOUR LIFE?: NO
1. IN THE PAST MONTH, HAVE YOU WISHED YOU WERE DEAD OR WISHED YOU COULD GO TO SLEEP AND NOT WAKE UP?: NO
2. HAVE YOU ACTUALLY HAD ANY THOUGHTS OF KILLING YOURSELF?: NO

## 2024-03-12 ASSESSMENT — PATIENT HEALTH QUESTIONNAIRE - PHQ9
2. FEELING DOWN, DEPRESSED OR HOPELESS: NOT AT ALL
1. LITTLE INTEREST OR PLEASURE IN DOING THINGS: NOT AT ALL
SUM OF ALL RESPONSES TO PHQ9 QUESTIONS 1 AND 2: 0

## 2024-03-12 NOTE — PROGRESS NOTES
Subjective   Patient ID: Stoney Snow is a 59 y.o. male who presents for Cough (Patient here for follow up visit. Patient states his breathing is good. Patient states he has a productive cough with white mucous. Patient denies any oxygen or cpap use. Patient was supposed to be set up on CPAP but has not heard anything. Patient not a current smoker. Patient has not needed rescue inhaler. Patient feels like he has water in his ears. Patient has no other concerns at this time. ).  Cough  Associated symptoms include postnasal drip, rhinorrhea and shortness of breath.     Mr. Snow is referred here for evaluation of chronic cough. Patient states his breathing is good from day to day life. He denies any shortness of breath even with steps. Patient states he has a productive cough with clear white mucous. This worsened 1.5 years ago. Patient states it is worst when he lays down. Patient denies any oxygen or cpap use at home. Patient not a current smoker. Patient stopped smoking after CABG in June 2020. Patient smoked about 2-3 PPD for about 40 years as he started smoking at age of 15. Patient was exposed to second hand smoke growing up. Patient has 2 dogs and a cat at home. 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 He states his father had lung cancer. He states when he was smoking he used to have cough for last several years but it was dry and now it is associated with clear phlegm. 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 Stoddard who did surgery for him. He notes sinus drainage and has hoarseness of voice. He also has uncontrolled GERD despite taking PPI. He is on skyrizi for psoriasis since 2023. He is not taking Spiriva as prescribed.     He is here for follow up.  On the last visit he reported he does not have shortness of breath but has cough did not improve with Stiolto.  We changed to Breztri which has helped his breathing and coughing.   However he continues to have postnasal drip and today ear fullness.  He admits that he is only taking cetirizine and Flonase on as-needed basis and forgets to take them.  He is currently not taking montelukast and famotidine. He did complete HST due to EDS and fatigue which showed MIKE.  He was ordered to start AutoPap 4-14 but states DME did not bring the AutoPap yet.    Review of Systems   HENT:  Positive for postnasal drip and rhinorrhea.    Respiratory:  Positive for cough and shortness of breath.    All other systems reviewed and are negative.      Objective   Physical Exam  Vitals and nursing note reviewed.   Constitutional:       Appearance: Normal appearance.   HENT:      Head: Normocephalic.      Nose: Nose normal.      Mouth/Throat:      Pharynx: Oropharynx is clear.   Eyes:      Extraocular Movements: Extraocular movements intact.      Conjunctiva/sclera: Conjunctivae normal.      Pupils: Pupils are equal, round, and reactive to light.   Cardiovascular:      Rate and Rhythm: Normal rate and regular rhythm.      Pulses: Normal pulses.      Heart sounds: Normal heart sounds.   Pulmonary:      Effort: Pulmonary effort is normal.      Breath sounds: Normal breath sounds.   Abdominal:      General: Bowel sounds are normal.      Palpations: Abdomen is soft.   Musculoskeletal:         General: Normal range of motion.      Cervical back: Normal range of motion.   Skin:     General: Skin is warm.   Neurological:      General: No focal deficit present.      Mental Status: He is alert and oriented to person, place, and time. Mental status is at baseline.   Psychiatric:         Mood and Affect: Mood normal.         Behavior: Behavior normal.       Assessment/Plan   1. COPD   2. Allergic rhinitis due to pollen  3. GERD  4. Obstructive sleep apnea  5. Former cigarette smoker quit 2020  6. CAD  7. BMI 30.4  8. Peripheral eosinophilia  9. Cough variant asthma  10. Serous otitis media     Plan:  Patient has symptoms of  COPD with chronic bronchitis. He had borderline BDR at 37% in 2020 on PFTs in small airways and has peripheral eosinophilia. Cough variant asthma was suspected and ICS was added.  He is responding very well to Breztri now.     -PFTs with moderate obstruction FEV1 68%.  Continue Breztri as he needs ICS for cough variant asthma & needs dual bronchodilation for COPD..  -Normal RAST and AAT  -optimize rx for GERD and allergic rhinitis for cough as well. He is taking cetirizine in AM prn and Flonase prn. He stopped montelukast which is ok and monitor without it.   -He is not taking famotidine HS as Pantoprazole daily is helping him.  -LDCT chest Feb 2024 [facility] without nodules. Recommend LDCT chest 2/28/25  -He did complete HST in Sep 2023 that showed KATHRIN 19.2 and supine KATHRIN 52.9, POX at 88% or less for 19 minutes. Discussed treatment options at length including discussion for weight control, side-lying position and treatment for nasal allergies/obstruction. Counseled at length regarding implications of untreated MIKE including cardiovascular, metabolic, neurologic morbidity and mortality.  -He is willing to start Auto PAP 4 to 14 cwp and order sent to [facility]; states he has not received it. Recommend send order to a different DME and follow up.  -Follows with Dr. Clayton for CAD. Recommend change lisinopril to ARBs to assess if any possible contribution of ACE I related cough. Received message through Epic Chat from Dr. Davian Clayton that it is ok to change ACE I to ARBS and that we could to it.  -today with serous otitis bilaterally. Recommend cetirizine and flonase. Advised to notify if any fever, ear aches or worsening symptoms to consider atbx course.      Start PAP therapy. Continue Breztri. Take cetirizine daily and flonase prn. Repeat LDCT chest in Feb 2025. Follow up yearly unless he gets CPAP sooner in which case he will see us in 2 months after starting PAP therapy.

## 2024-03-12 NOTE — PATIENT INSTRUCTIONS
1. Please complete CT chest yearly.  2. Please take Breztri 2 puffs twice daily and stop Stiolto 2 puffs once daily in the morning.  3. Take cetirizine daily as needed for cough and sinus congestion.  4. Take flonase nasal spray as needed for postnasal drip and sinus symptoms.  5. Please start using CPAP as soon as you receive it. Please contact us if you don't hear from [facility] in the next couple weeks.  6. Follow up with Dr. Sarkar in 1 year or earlier if needed for any recurring respiratory issues. Also, schedule an appointment in 2 months after you receive your CPAP and bring your CPAP with you.

## 2024-04-17 DIAGNOSIS — K21.9 GASTROESOPHAGEAL REFLUX DISEASE WITHOUT ESOPHAGITIS: ICD-10-CM

## 2024-04-17 RX ORDER — PANTOPRAZOLE SODIUM 40 MG/1
40 TABLET, DELAYED RELEASE ORAL DAILY
Qty: 90 TABLET | Refills: 1 | Status: SHIPPED | OUTPATIENT
Start: 2024-04-17

## 2024-04-17 NOTE — TELEPHONE ENCOUNTER
Pt called rx line at 341p requesting pended med    Called pt and informed rx was sent to [email or web address] on 3/11 - 6 mos supply. He stated he spoke to [facility] and they do not have it.    Next OV 6/16  [facility] Thx

## 2024-06-12 ENCOUNTER — TELEPHONE (OUTPATIENT)
Dept: CARDIOLOGY | Facility: CLINIC | Age: 59
End: 2024-06-12
Payer: COMMERCIAL

## 2024-07-01 ENCOUNTER — HOSPITAL ENCOUNTER (OUTPATIENT)
Dept: GASTROENTEROLOGY | Facility: HOSPITAL | Age: 59
Discharge: HOME | End: 2024-07-01
Payer: COMMERCIAL

## 2024-07-01 ENCOUNTER — ANESTHESIA (OUTPATIENT)
Dept: GASTROENTEROLOGY | Facility: HOSPITAL | Age: 59
End: 2024-07-01
Payer: COMMERCIAL

## 2024-07-01 ENCOUNTER — ANESTHESIA EVENT (OUTPATIENT)
Dept: GASTROENTEROLOGY | Facility: HOSPITAL | Age: 59
End: 2024-07-01
Payer: COMMERCIAL

## 2024-07-01 VITALS
RESPIRATION RATE: 16 BRPM | BODY MASS INDEX: 29.84 KG/M2 | TEMPERATURE: 97.3 F | WEIGHT: 240 LBS | OXYGEN SATURATION: 96 % | SYSTOLIC BLOOD PRESSURE: 141 MMHG | HEART RATE: 92 BPM | HEIGHT: 75 IN | DIASTOLIC BLOOD PRESSURE: 93 MMHG

## 2024-07-01 DIAGNOSIS — Z86.010 HX OF COLONIC POLYPS: ICD-10-CM

## 2024-07-01 DIAGNOSIS — Z12.11 SCREENING FOR COLON CANCER: Primary | ICD-10-CM

## 2024-07-01 PROCEDURE — 3700000002 HC GENERAL ANESTHESIA TIME - EACH INCREMENTAL 1 MINUTE

## 2024-07-01 PROCEDURE — 45380 COLONOSCOPY AND BIOPSY: CPT | Performed by: SURGERY

## 2024-07-01 PROCEDURE — 3700000001 HC GENERAL ANESTHESIA TIME - INITIAL BASE CHARGE

## 2024-07-01 PROCEDURE — 2500000005 HC RX 250 GENERAL PHARMACY W/O HCPCS: Performed by: NURSE ANESTHETIST, CERTIFIED REGISTERED

## 2024-07-01 PROCEDURE — 2500000004 HC RX 250 GENERAL PHARMACY W/ HCPCS (ALT 636 FOR OP/ED): Performed by: SURGERY

## 2024-07-01 PROCEDURE — 7100000009 HC PHASE TWO TIME - INITIAL BASE CHARGE

## 2024-07-01 PROCEDURE — 2500000004 HC RX 250 GENERAL PHARMACY W/ HCPCS (ALT 636 FOR OP/ED): Performed by: NURSE ANESTHETIST, CERTIFIED REGISTERED

## 2024-07-01 PROCEDURE — 7100000010 HC PHASE TWO TIME - EACH INCREMENTAL 1 MINUTE

## 2024-07-01 RX ORDER — SODIUM CHLORIDE 9 MG/ML
20 INJECTION, SOLUTION INTRAVENOUS CONTINUOUS
Status: DISCONTINUED | OUTPATIENT
Start: 2024-07-01 | End: 2024-07-02 | Stop reason: HOSPADM

## 2024-07-01 RX ORDER — PROPOFOL 10 MG/ML
INJECTION, EMULSION INTRAVENOUS AS NEEDED
Status: DISCONTINUED | OUTPATIENT
Start: 2024-07-01 | End: 2024-07-01

## 2024-07-01 RX ORDER — LIDOCAINE HYDROCHLORIDE 20 MG/ML
INJECTION, SOLUTION EPIDURAL; INFILTRATION; INTRACAUDAL; PERINEURAL AS NEEDED
Status: DISCONTINUED | OUTPATIENT
Start: 2024-07-01 | End: 2024-07-01

## 2024-07-01 SDOH — HEALTH STABILITY: MENTAL HEALTH: CURRENT SMOKER: 0

## 2024-07-01 ASSESSMENT — PAIN - FUNCTIONAL ASSESSMENT
PAIN_FUNCTIONAL_ASSESSMENT: 0-10

## 2024-07-01 ASSESSMENT — PAIN SCALES - GENERAL
PAINLEVEL_OUTOF10: 0 - NO PAIN
PAIN_LEVEL: 0
PAINLEVEL_OUTOF10: 0 - NO PAIN

## 2024-07-01 NOTE — H&P
"History Of Present Illness  Stoney Snow is a 59 y.o. male presenting with hx polyps.     Past Medical History  He has a past medical history of Atherosclerosis of coronary artery bypass graft of native heart without angina pectoris (10/30/2023), Colon polyp, Emphysema lung (Multi), GERD (gastroesophageal reflux disease), Hyperlipidemia, Hypertension, Multiple gastric ulcers, Myocardial infarction (Multi), and Sleep apnea.    Surgical History  He has a past surgical history that includes Other surgical history (10/06/2021); Other surgical history (10/06/2021); Other surgical history (10/06/2021); and Other surgical history (10/06/2021).     Social History  He reports that he quit smoking about 3 years ago. His smoking use included cigarettes. He started smoking about 41 years ago. He has a 76 pack-year smoking history. He has never used smokeless tobacco. He reports that he does not currently use alcohol. He reports that he does not use drugs.    Family History  Family History   Problem Relation Name Age of Onset    Other (cardiac disorder) Mother      Coronary artery disease Mother      Hypertension Mother      Lung cancer Father      Coronary artery disease Father      Hypertension Father          Allergies  Patient has no known allergies.    Review of Systems   All other systems reviewed and are negative.       Physical Exam  Vitals reviewed.   Cardiovascular:      Rate and Rhythm: Normal rate and regular rhythm.   Pulmonary:      Breath sounds: Normal breath sounds.   Skin:     General: Skin is warm and dry.   Neurological:      Mental Status: He is alert.   Psychiatric:         Mood and Affect: Mood normal.          Last Recorded Vitals  Blood pressure (!) 146/99, pulse 109, temperature 36.6 °C (97.9 °F), temperature source Temporal, resp. rate 17, height 1.905 m (6' 3\"), weight 109 kg (240 lb), SpO2 95%.    Relevant Results               Assessment/Plan   Active Problems:  There are no active Hospital " Problems.      For colo       I spent 15 minutes in the professional and overall care of this patient.      Tramaine Gardner MD

## 2024-07-01 NOTE — ANESTHESIA POSTPROCEDURE EVALUATION
Patient: Stoney Snow    Procedure Summary       Date: 07/01/24 Room / Location: [facility]    Anesthesia Start: 0830 Anesthesia Stop: 0907    Procedure: COLONOSCOPY Diagnosis:       Screening for colon cancer      Hx of colonic polyps      Screening for colon cancer    Scheduled Providers: Tramaine Gardner MD Responsible Provider: MAXIMILIANO Sanz    Anesthesia Type: MAC ASA Status: 3            Anesthesia Type: MAC    Vitals Value Taken Time   /85 07/01/24 0904   Temp 36.5 °C (97.7 °F) 07/01/24 0904   Pulse 98 07/01/24 0904   Resp 16 07/01/24 0904   SpO2 97 % 07/01/24 0904       Anesthesia Post Evaluation    Patient location during evaluation: PACU  Patient participation: complete - patient participated  Level of consciousness: awake and alert  Pain score: 0  Pain management: adequate  Airway patency: patent  Cardiovascular status: acceptable and hemodynamically stable  Respiratory status: acceptable, spontaneous ventilation and nasal cannula  Hydration status: acceptable  Postoperative Nausea and Vomiting: none        There were no known notable events for this encounter.

## 2024-07-01 NOTE — ANESTHESIA PREPROCEDURE EVALUATION
Patient: Stoney Snow    Procedure Information       Date/Time: 07/01/24 0800    Scheduled providers: Tramaine Gardner MD    Procedure: COLONOSCOPY    Location:  [facility]            Relevant Problems   Anesthesia (within normal limits)      Cardiac   (+) Atherosclerosis of coronary artery bypass graft of native heart without angina pectoris   (+) Benign essential hypertension   (+) Hyperlipidemia   (+) Paroxysmal ventricular tachycardia (Multi)      Pulmonary   (+) Obstructive sleep apnea   (+) Pulmonary emphysema (Multi)      GI   (+) Gastroesophageal reflux disease      /Renal   (+) Benign prostatic hyperplasia with lower urinary tract symptoms      HEENT   (+) Chronic sinusitis      Skin   (+) Atopic dermatitis       Clinical information reviewed:   Tobacco  Allergies  Meds   Med Hx  Surg Hx   Fam Hx  Soc Hx        NPO Detail:  NPO/Void Status  Date of Last Liquid: 07/01/24  Time of Last Liquid: 0000  Date of Last Solid: 06/29/24  Last Intake Type: Clear fluids         Physical Exam    Airway  Mallampati: II  TM distance: >3 FB  Neck ROM: full     Cardiovascular - normal exam     Dental        Pulmonary - normal exam     Abdominal            Anesthesia Plan    History of general anesthesia?: yes  History of complications of general anesthesia?: no    ASA 3     MAC     The patient is not a current smoker.    intravenous induction   Anesthetic plan and risks discussed with patient.    Plan discussed with CRNA.

## 2024-07-11 LAB
LABORATORY COMMENT REPORT: NORMAL
PATH REPORT.FINAL DX SPEC: NORMAL
PATH REPORT.GROSS SPEC: NORMAL
PATH REPORT.RELEVANT HX SPEC: NORMAL
PATH REPORT.TOTAL CANCER: NORMAL

## 2024-08-08 ENCOUNTER — OFFICE VISIT (OUTPATIENT)
Dept: PRIMARY CARE | Facility: CLINIC | Age: 59
End: 2024-08-08
Payer: COMMERCIAL

## 2024-08-08 VITALS
TEMPERATURE: 97.5 F | HEART RATE: 106 BPM | WEIGHT: 226 LBS | SYSTOLIC BLOOD PRESSURE: 104 MMHG | DIASTOLIC BLOOD PRESSURE: 68 MMHG | OXYGEN SATURATION: 97 % | BODY MASS INDEX: 28.25 KG/M2

## 2024-08-08 DIAGNOSIS — Z12.5 SCREENING FOR MALIGNANT NEOPLASM OF PROSTATE: ICD-10-CM

## 2024-08-08 DIAGNOSIS — R30.0 DYSURIA: Primary | ICD-10-CM

## 2024-08-08 DIAGNOSIS — R36.9 PENILE DISCHARGE: ICD-10-CM

## 2024-08-08 PROBLEM — M25.631 STIFFNESS OF RIGHT WRIST JOINT: Status: RESOLVED | Noted: 2023-01-17 | Resolved: 2024-08-08

## 2024-08-08 PROBLEM — M25.641 STIFFNESS OF FINGER JOINT OF RIGHT HAND: Status: RESOLVED | Noted: 2023-01-17 | Resolved: 2024-08-08

## 2024-08-08 PROBLEM — M25.539 PAIN IN WRIST: Status: RESOLVED | Noted: 2023-01-17 | Resolved: 2024-08-08

## 2024-08-08 LAB
POC APPEARANCE, URINE: CLEAR
POC BILIRUBIN, URINE: NEGATIVE
POC BLOOD, URINE: NEGATIVE
POC COLOR, URINE: YELLOW
POC GLUCOSE, URINE: ABNORMAL MG/DL
POC KETONES, URINE: NEGATIVE MG/DL
POC LEUKOCYTES, URINE: NEGATIVE
POC NITRITE,URINE: NEGATIVE
POC PH, URINE: 5.5 PH
POC PROTEIN, URINE: NEGATIVE MG/DL
POC SPECIFIC GRAVITY, URINE: 1.01
POC UROBILINOGEN, URINE: 0.2 EU/DL

## 2024-08-08 PROCEDURE — 3074F SYST BP LT 130 MM HG: CPT | Performed by: PHYSICIAN ASSISTANT

## 2024-08-08 PROCEDURE — 3078F DIAST BP <80 MM HG: CPT | Performed by: PHYSICIAN ASSISTANT

## 2024-08-08 PROCEDURE — 81003 URINALYSIS AUTO W/O SCOPE: CPT | Performed by: PHYSICIAN ASSISTANT

## 2024-08-08 PROCEDURE — 99214 OFFICE O/P EST MOD 30 MIN: CPT | Performed by: PHYSICIAN ASSISTANT

## 2024-08-08 PROCEDURE — 87086 URINE CULTURE/COLONY COUNT: CPT

## 2024-08-08 RX ORDER — CIPROFLOXACIN 500 MG/1
500 TABLET ORAL 2 TIMES DAILY
Qty: 14 TABLET | Refills: 0 | Status: SHIPPED | OUTPATIENT
Start: 2024-08-08 | End: 2024-08-15

## 2024-08-08 NOTE — PROGRESS NOTES
Subjective   Patient ID: Stoney Snow Jr. is a 59 y.o. male who presents for Abdominal Pain (Lower bladder pain) and Penile Discharge (And pain with urination x 10 days).    HPI   Patient c/o UTI symptoms.   Has dysuria. Onset was gradual over the past 10 days. Has worsened since onset. No alleviating factors. Reports associated burning with urination, urinary frequency and urgency, but denies associated chills, costovertebral angle tenderness, fever, hematuria, incontinence, nausea and vomiting.  Has some bladder pressure. The past few days has had a little milky penile discharge.   Had slight constipation several days ago but that improved.     Hasn't been sexually active for couple years.     Lab Results   Component Value Date    PSA 3.31 09/19/2022    PSA 3.20 04/12/2021    PSA 3.51 03/30/2020        reports that he quit smoking about 3 years ago. His smoking use included cigarettes. He started smoking about 41 years ago. He has a 76 pack-year smoking history. He has never used smokeless tobacco.      Review of Systems   Constitutional:  Negative for chills and fever.   Gastrointestinal:  Negative for abdominal pain.       Objective   /68   Pulse 106   Temp 36.4 °C (97.5 °F)   Wt 103 kg (226 lb)   SpO2 97%   BMI 28.25 kg/m²     Physical Exam  Vitals and nursing note reviewed.   HENT:      Head: Normocephalic.   Eyes:      General: No scleral icterus.  Cardiovascular:      Rate and Rhythm: Normal rate and regular rhythm.   Pulmonary:      Effort: Pulmonary effort is normal.      Breath sounds: Normal breath sounds.   Abdominal:      Palpations: Abdomen is soft. There is no mass.      Tenderness: There is no abdominal tenderness. There is no right CVA tenderness or left CVA tenderness.   Skin:     General: Skin is warm and dry.   Neurological:      Mental Status: He is alert.   Psychiatric:         Mood and Affect: Affect normal.         Office Visit on 08/08/2024   Component Date Value Ref Range  Status        Final    POC Color, Urine 08/08/2024 Yellow  Straw, Yellow, Light-Yellow Final    POC Appearance, Urine 08/08/2024 Clear  Clear Final    POC Glucose, Urine 08/08/2024 500 (3+) (A)  NEGATIVE mg/dl Final    POC Bilirubin, Urine 08/08/2024 NEGATIVE  NEGATIVE Final    POC Ketones, Urine 08/08/2024 NEGATIVE  NEGATIVE mg/dl Final    POC Specific Gravity, Urine 08/08/2024 1.010  1.005 - 1.035 Final    POC Blood, Urine 08/08/2024 NEGATIVE  NEGATIVE Final    POC PH, Urine 08/08/2024 5.5  No Reference Range Established PH Final    POC Protein, Urine 08/08/2024 NEGATIVE  NEGATIVE, 30 (1+) mg/dl Final    POC Urobilinogen, Urine 08/08/2024 0.2  0.2, 1.0 EU/DL Final    Poc Nitrite, Urine 08/08/2024 NEGATIVE  NEGATIVE Final    POC Leukocytes, Urine 08/08/2024 NEGATIVE  NEGATIVE Final          Assessment/Plan   Diagnoses and all orders for this visit:  Dysuria  -     Urine Culture  -     POCT UA Automated manually resulted  -     ciprofloxacin (Cipro) 500 mg tablet; Take 1 tablet (500 mg) by mouth 2 times a day for 7 days.  Penile discharge  Screening for malignant neoplasm of prostate  -     Prostate Specific Antigen; Future       Send urine cx.   Discussed possibility of prostatitis.  Get PSA level with upcoming labs.   Rx cipro.   Advised to follow up with his urologist Dr Cash.   Reschedule his missed June appt in the next few weeks with fasting labs.

## 2024-08-09 LAB — BACTERIA UR CULT: NO GROWTH

## 2024-08-15 ASSESSMENT — ENCOUNTER SYMPTOMS
CHILLS: 0
ABDOMINAL PAIN: 0
FEVER: 0

## 2024-08-21 ENCOUNTER — LAB (OUTPATIENT)
Dept: LAB | Facility: LAB | Age: 59
End: 2024-08-21
Payer: COMMERCIAL

## 2024-08-21 DIAGNOSIS — R73.01 IFG (IMPAIRED FASTING GLUCOSE): ICD-10-CM

## 2024-08-21 DIAGNOSIS — Z12.5 SCREENING FOR MALIGNANT NEOPLASM OF PROSTATE: ICD-10-CM

## 2024-08-21 DIAGNOSIS — E78.5 HYPERLIPIDEMIA, UNSPECIFIED HYPERLIPIDEMIA TYPE: ICD-10-CM

## 2024-08-21 DIAGNOSIS — I10 BENIGN ESSENTIAL HTN: ICD-10-CM

## 2024-08-21 LAB
ALBUMIN SERPL BCP-MCNC: 4.4 G/DL (ref 3.4–5)
ALP SERPL-CCNC: 72 U/L (ref 33–120)
ALT SERPL W P-5'-P-CCNC: 18 U/L (ref 10–52)
ANION GAP SERPL CALC-SCNC: 13 MMOL/L (ref 10–20)
AST SERPL W P-5'-P-CCNC: 15 U/L (ref 9–39)
BILIRUB SERPL-MCNC: 2.8 MG/DL (ref 0–1.2)
BUN SERPL-MCNC: 14 MG/DL (ref 6–23)
CALCIUM SERPL-MCNC: 9.6 MG/DL (ref 8.6–10.3)
CHLORIDE SERPL-SCNC: 99 MMOL/L (ref 98–107)
CHOLEST SERPL-MCNC: 176 MG/DL (ref 0–199)
CHOLESTEROL/HDL RATIO: 5.2
CO2 SERPL-SCNC: 27 MMOL/L (ref 21–32)
CREAT SERPL-MCNC: 1.17 MG/DL (ref 0.5–1.3)
EGFRCR SERPLBLD CKD-EPI 2021: 72 ML/MIN/1.73M*2
EST. AVERAGE GLUCOSE BLD GHB EST-MCNC: 315 MG/DL
GLUCOSE SERPL-MCNC: 390 MG/DL (ref 74–99)
HBA1C MFR BLD: 12.6 %
HDLC SERPL-MCNC: 33.8 MG/DL
LDLC SERPL CALC-MCNC: ABNORMAL MG/DL
NON HDL CHOLESTEROL: 142 MG/DL (ref 0–149)
POTASSIUM SERPL-SCNC: 4.2 MMOL/L (ref 3.5–5.3)
PROT SERPL-MCNC: 7.2 G/DL (ref 6.4–8.2)
PSA SERPL-MCNC: 3.45 NG/ML
SODIUM SERPL-SCNC: 135 MMOL/L (ref 136–145)
TRIGL SERPL-MCNC: 523 MG/DL (ref 0–149)
VLDL: ABNORMAL

## 2024-08-21 PROCEDURE — 36415 COLL VENOUS BLD VENIPUNCTURE: CPT

## 2024-08-27 ENCOUNTER — APPOINTMENT (OUTPATIENT)
Dept: PRIMARY CARE | Facility: CLINIC | Age: 59
End: 2024-08-27
Payer: COMMERCIAL

## 2024-08-27 VITALS
HEART RATE: 106 BPM | BODY MASS INDEX: 27.5 KG/M2 | SYSTOLIC BLOOD PRESSURE: 104 MMHG | WEIGHT: 220 LBS | OXYGEN SATURATION: 94 % | DIASTOLIC BLOOD PRESSURE: 76 MMHG | TEMPERATURE: 97.4 F

## 2024-08-27 DIAGNOSIS — J43.9 PULMONARY EMPHYSEMA, UNSPECIFIED EMPHYSEMA TYPE (MULTI): ICD-10-CM

## 2024-08-27 DIAGNOSIS — I25.10 CORONARY ARTERY DISEASE INVOLVING NATIVE CORONARY ARTERY OF NATIVE HEART WITHOUT ANGINA PECTORIS: ICD-10-CM

## 2024-08-27 DIAGNOSIS — R17 ELEVATED BILIRUBIN: ICD-10-CM

## 2024-08-27 DIAGNOSIS — E78.5 HYPERLIPIDEMIA, UNSPECIFIED HYPERLIPIDEMIA TYPE: ICD-10-CM

## 2024-08-27 DIAGNOSIS — I10 BENIGN ESSENTIAL HYPERTENSION: ICD-10-CM

## 2024-08-27 DIAGNOSIS — N52.9 ERECTILE DYSFUNCTION, UNSPECIFIED ERECTILE DYSFUNCTION TYPE: ICD-10-CM

## 2024-08-27 DIAGNOSIS — E11.65 TYPE 2 DIABETES MELLITUS WITH HYPERGLYCEMIA, WITHOUT LONG-TERM CURRENT USE OF INSULIN (MULTI): Primary | ICD-10-CM

## 2024-08-27 DIAGNOSIS — K21.9 GASTROESOPHAGEAL REFLUX DISEASE WITHOUT ESOPHAGITIS: ICD-10-CM

## 2024-08-27 DIAGNOSIS — G47.33 OBSTRUCTIVE SLEEP APNEA: ICD-10-CM

## 2024-08-27 DIAGNOSIS — I47.20 VENTRICULAR TACHYCARDIA (MULTI): ICD-10-CM

## 2024-08-27 PROBLEM — G47.30 HYPERSOMNIA WITH SLEEP APNEA: Status: ACTIVE | Noted: 2024-08-27

## 2024-08-27 PROBLEM — R05.3 CHRONIC COUGH: Status: ACTIVE | Noted: 2017-11-06

## 2024-08-27 PROBLEM — J30.1 ALLERGIC RHINITIS DUE TO POLLEN: Status: ACTIVE | Noted: 2024-08-27

## 2024-08-27 PROCEDURE — 4010F ACE/ARB THERAPY RXD/TAKEN: CPT | Performed by: PHYSICIAN ASSISTANT

## 2024-08-27 PROCEDURE — 3078F DIAST BP <80 MM HG: CPT | Performed by: PHYSICIAN ASSISTANT

## 2024-08-27 PROCEDURE — 3074F SYST BP LT 130 MM HG: CPT | Performed by: PHYSICIAN ASSISTANT

## 2024-08-27 PROCEDURE — 99214 OFFICE O/P EST MOD 30 MIN: CPT | Performed by: PHYSICIAN ASSISTANT

## 2024-08-27 PROCEDURE — 3046F HEMOGLOBIN A1C LEVEL >9.0%: CPT | Performed by: PHYSICIAN ASSISTANT

## 2024-08-27 RX ORDER — BLOOD-GLUCOSE METER
KIT MISCELLANEOUS
Qty: 1 EACH | Refills: 0 | Status: SHIPPED | OUTPATIENT
Start: 2024-08-27

## 2024-08-27 RX ORDER — LANCETS
EACH MISCELLANEOUS
Qty: 100 EACH | Refills: 3 | Status: SHIPPED | OUTPATIENT
Start: 2024-08-27

## 2024-08-27 RX ORDER — PANTOPRAZOLE SODIUM 40 MG/1
40 TABLET, DELAYED RELEASE ORAL DAILY
Qty: 90 TABLET | Refills: 1 | Status: SHIPPED | OUTPATIENT
Start: 2024-08-27

## 2024-08-27 RX ORDER — ROSUVASTATIN CALCIUM 10 MG/1
10 TABLET, COATED ORAL DAILY
Qty: 90 TABLET | Refills: 1 | Status: SHIPPED | OUTPATIENT
Start: 2024-08-27

## 2024-08-27 RX ORDER — FENOFIBRATE 145 MG/1
145 TABLET, FILM COATED ORAL DAILY
Qty: 90 TABLET | Refills: 1 | Status: SHIPPED | OUTPATIENT
Start: 2024-08-27 | End: 2025-02-23

## 2024-08-27 RX ORDER — BLOOD SUGAR DIAGNOSTIC
1 STRIP MISCELLANEOUS DAILY
Qty: 100 STRIP | Refills: 3 | Status: SHIPPED | OUTPATIENT
Start: 2024-08-27

## 2024-08-27 RX ORDER — METFORMIN HYDROCHLORIDE 500 MG/1
1000 TABLET, EXTENDED RELEASE ORAL
Qty: 180 TABLET | Refills: 1 | Status: SHIPPED | OUTPATIENT
Start: 2024-08-27

## 2024-08-27 ASSESSMENT — ENCOUNTER SYMPTOMS
SHORTNESS OF BREATH: 0
ABDOMINAL PAIN: 0
PALPITATIONS: 0

## 2024-08-27 NOTE — PATIENT INSTRUCTIONS
Start checking sugars every morning.  Cut down carb/sugar intake. Switch drinks to sugar free versions.  Discuss with dermatologist whether Skyrizi can be stopped due to diabetes potentially being related.   Try to get CPAP started.

## 2024-08-27 NOTE — PROGRESS NOTES
Subjective   Patient ID: Stoney Snow Jr. is a 59 y.o. male who presents for COPD, Coronary Artery Disease, Hypertension, and Hyperlipidemia (Review BW).    HPI   Patient presents for COPD, CAD, IFG, GERD, and hyperlipidemia.      COPD:  Emphysema seems to be doing well with Breztri. Sees pulmonologist Dr Sarkar 3/12/24.     MIKE: Had sleep study that showed MIKE. Dr Sarkar started him on CPAP but hasn't gotten equipment yet. Still tired a lot. Not sleeping well the past year. Has some grandkids that they are caring for and they have been disrupting his sleep.      Hyperlipidemia: LDL unknown since trigs 523. Switched to Rosuvastatin 10mg every day after last visit (since was getting aches with atorvastatin). Tolerating med well.   LDL Calculated (no units)   Date Value   08/21/2024      Comment:     The calculation of LDL and VLDL are inaccurate when the Triglycerides are greater than 400 mg/dL or when the patient is non-fasting. If LDL measurement is necessary contact the testing laboratory for an alternative LDL assay.                                  Near   Borderline      AGE      Desirable  Optimal    High     High     Very High     0-19 Y     0 - 109     ---    110-129   >/= 130     ----    20-24 Y     0 - 119     ---    120-159   >/= 160     ----      >24 Y     0 -  99   100-129  130-159   160-189     >/=190     03/05/2024      Comment:     The calculation of LDL and VLDL are inaccurate when the Triglycerides are greater than 400 mg/dL or when the patient is non-fasting. If LDL measurement is necessary contact the testing laboratory for an alternative LDL assay.                                  Near   Borderline      AGE      Desirable  Optimal    High     High     Very High     0-19 Y     0 - 109     ---    110-129   >/= 130     ----    20-24 Y     0 - 119     ---    120-159   >/= 160     ----      >24 Y     0 -  99   100-129  130-159   160-189     >/=190       LDL (mg/dL)   Date Value   09/11/2023 52    06/02/2023 44     Triglycerides (mg/dL)   Date Value   08/21/2024 523 (H)   03/05/2024 490 (H)     HDL-Cholesterol (mg/dL)   Date Value   08/21/2024 33.8   03/05/2024 31.8       IFG: A1c hedy a lot to 12.6.  Eating more carbs lately.  New DM.  Drinking 2 cans of pop and gatorade twice per week (gatorade is new for him lately). Also started Skyrizi for psoriasis earlier this year which has been reported to rarely cause diabetes.  Having some urinary frequency.   Hemoglobin A1C (%)   Date Value   08/21/2024 12.6 (H)   03/05/2024 6.4 (H)   12/07/2023 6.3 (H)   09/11/2023 6.3 (A)   06/02/2023 6.1 (A)   02/28/2023 6.1 (A)   09/19/2022 6.1 (A)     Glucose (mg/dL)   Date Value   08/21/2024 390 (H)   03/05/2024 144 (H)        Bili hedy on labs.  LFTs WNL. No abdominal pain.   ALT (U/L)   Date Value   08/21/2024 18   03/05/2024 19     AST (U/L)   Date Value   08/21/2024 15   03/05/2024 16     Bilirubin, Total (mg/dL)   Date Value   08/21/2024 2.8 (H)   03/05/2024 1.7 (H)   12/07/2023 1.7 (H)     Total Bilirubin (mg/dL)   Date Value   09/11/2023 1.7 (H)   06/02/2023 1.4 (H)   05/22/2023 1.8 (H)   02/28/2023 2.0 (H)   09/19/2022 1.9 (H)         HTN: Taking and tolerating Metoprolol every day and Losartan 25mg. BPs have been good.      CAD: sees Dr Clayton. Doing well. No CP or SOB.  V.tach has been controlled.  Sees Dr Clayton -- last appt 10/30/23.  Had NST 12/4/23 that was good.     GERD:  Pantoprazole controlling reflux well. No abdominal pain or breakthrough reflux, unless he misses a dose.      ED: Rarely taking and tolerating Cialis 10mg as needed from urologist. Works well.      Psoriasis: Seeing dermatologist for psoriasis--using clobetasol as needed.   Skyrizi working well.  Started this earlier this year.        Last colonoscopy 7/1/24 with Dr Gardner that showed sessile serrated adenoma and tubular adenomas. Repeat in 7/2025.      reports that he quit smoking about 3 years ago. His smoking use included cigarettes. He  started smoking about 41 years ago. He has a 76 pack-year smoking history. He has never used smokeless tobacco.    Review of Systems   Respiratory:  Negative for shortness of breath.    Cardiovascular:  Negative for chest pain and palpitations.   Gastrointestinal:  Negative for abdominal pain.       Objective   /76   Pulse 106   Temp 36.3 °C (97.4 °F)   Wt 99.8 kg (220 lb)   SpO2 94%   BMI 27.50 kg/m²     Physical Exam  HENT:      Head: Normocephalic.   Eyes:      General: No scleral icterus.  Cardiovascular:      Rate and Rhythm: Normal rate and regular rhythm.   Pulmonary:      Effort: Pulmonary effort is normal.      Breath sounds: Normal breath sounds.   Abdominal:      Palpations: Abdomen is soft. There is no mass.      Tenderness: There is no abdominal tenderness.   Skin:     General: Skin is warm and dry.   Neurological:      Mental Status: He is alert.   Psychiatric:         Mood and Affect: Affect normal.         Assessment/Plan   Diagnoses and all orders for this visit:  Type 2 diabetes mellitus with hyperglycemia, without long-term current use of insulin (Multi)  -     metFORMIN XR (Glucophage-XR) 500 mg 24 hr tablet; Take 2 tablets (1,000 mg) by mouth once daily with breakfast. Do not crush, chew, or split.  -     Hemoglobin A1c; Future  -     Basic Metabolic Panel; Future  -     Hepatic Function Panel; Future  -     Lipid Panel; Future  -     FreeStyle [place] kit; Test once daily  -     FreeStyle Test strip; 1 strip once daily.  -     lancets misc; Test once daily  Benign essential hypertension  -     Basic Metabolic Panel; Future  Hyperlipidemia, unspecified hyperlipidemia type  -     rosuvastatin (Crestor) 10 mg tablet; Take 1 tablet (10 mg) by mouth once daily.  -     Basic Metabolic Panel; Future  -     Hepatic Function Panel; Future  -     Lipid Panel; Future  -     fenofibrate (Tricor) 145 mg tablet; Take 1 tablet (145 mg) by mouth once daily.  Coronary artery disease involving native  coronary artery of native heart without angina pectoris  -     rosuvastatin (Crestor) 10 mg tablet; Take 1 tablet (10 mg) by mouth once daily.  Gastroesophageal reflux disease without esophagitis  -     pantoprazole (ProtoNix) 40 mg EC tablet; Take 1 tablet (40 mg) by mouth once daily.  Pulmonary emphysema, unspecified emphysema type (Multi)  Ventricular tachycardia (Multi)  Erectile dysfunction, unspecified erectile dysfunction type  Obstructive sleep apnea  Elevated bilirubin  -     US abdomen limited liver; Future         Reviewed labs. Discussed new DM.  Start Rx Metformin XR 500mg 2 qd (titrate up slowly).   Add Rx Fenofibrate.   Discussed elevated bili. Get liver ultrasound.   Discussed with dermatologist whether Skyrizi could be stopped since that could be a potential trigger for this diabetes onset.  Discussed diet and exercise, and encouraged weight loss.   Refilled meds.   See specialists as scheduled.   Stressed importance of getting CPAP to help MIKE (discussed risks of MIKE).    Follow up in 3 months for recheck of DM, HTN, hyperlipidemia, CAD, COPD with fasting labs the week before.

## 2024-08-29 DIAGNOSIS — I10 BENIGN ESSENTIAL HYPERTENSION: ICD-10-CM

## 2024-08-29 RX ORDER — METOPROLOL SUCCINATE 25 MG/1
25 TABLET, EXTENDED RELEASE ORAL DAILY
Qty: 90 TABLET | Refills: 1 | Status: SHIPPED | OUTPATIENT
Start: 2024-08-29

## 2024-11-18 ENCOUNTER — APPOINTMENT (OUTPATIENT)
Dept: PRIMARY CARE | Facility: CLINIC | Age: 59
End: 2024-11-18
Payer: COMMERCIAL

## 2024-11-18 VITALS
SYSTOLIC BLOOD PRESSURE: 110 MMHG | WEIGHT: 229.4 LBS | DIASTOLIC BLOOD PRESSURE: 92 MMHG | BODY MASS INDEX: 28.67 KG/M2 | HEART RATE: 84 BPM | OXYGEN SATURATION: 98 % | TEMPERATURE: 96.7 F

## 2024-11-18 DIAGNOSIS — I25.10 CORONARY ARTERY DISEASE INVOLVING NATIVE CORONARY ARTERY OF NATIVE HEART WITHOUT ANGINA PECTORIS: ICD-10-CM

## 2024-11-18 DIAGNOSIS — I10 BENIGN ESSENTIAL HYPERTENSION: ICD-10-CM

## 2024-11-18 DIAGNOSIS — K76.0 FATTY LIVER: ICD-10-CM

## 2024-11-18 DIAGNOSIS — G47.33 OBSTRUCTIVE SLEEP APNEA: ICD-10-CM

## 2024-11-18 DIAGNOSIS — N52.9 ERECTILE DYSFUNCTION, UNSPECIFIED ERECTILE DYSFUNCTION TYPE: ICD-10-CM

## 2024-11-18 DIAGNOSIS — I47.20 VENTRICULAR TACHYCARDIA (MULTI): ICD-10-CM

## 2024-11-18 DIAGNOSIS — E78.5 HYPERLIPIDEMIA, UNSPECIFIED HYPERLIPIDEMIA TYPE: ICD-10-CM

## 2024-11-18 DIAGNOSIS — Z23 ENCOUNTER FOR IMMUNIZATION: ICD-10-CM

## 2024-11-18 DIAGNOSIS — J43.9 PULMONARY EMPHYSEMA, UNSPECIFIED EMPHYSEMA TYPE (MULTI): ICD-10-CM

## 2024-11-18 DIAGNOSIS — K21.9 GASTROESOPHAGEAL REFLUX DISEASE WITHOUT ESOPHAGITIS: ICD-10-CM

## 2024-11-18 DIAGNOSIS — E11.65 TYPE 2 DIABETES MELLITUS WITH HYPERGLYCEMIA, WITHOUT LONG-TERM CURRENT USE OF INSULIN: Primary | ICD-10-CM

## 2024-11-18 PROCEDURE — 3080F DIAST BP >= 90 MM HG: CPT | Performed by: PHYSICIAN ASSISTANT

## 2024-11-18 PROCEDURE — 90471 IMMUNIZATION ADMIN: CPT | Performed by: PHYSICIAN ASSISTANT

## 2024-11-18 PROCEDURE — 3074F SYST BP LT 130 MM HG: CPT | Performed by: PHYSICIAN ASSISTANT

## 2024-11-18 PROCEDURE — 3046F HEMOGLOBIN A1C LEVEL >9.0%: CPT | Performed by: PHYSICIAN ASSISTANT

## 2024-11-18 PROCEDURE — 4010F ACE/ARB THERAPY RXD/TAKEN: CPT | Performed by: PHYSICIAN ASSISTANT

## 2024-11-18 PROCEDURE — 3048F LDL-C <100 MG/DL: CPT | Performed by: PHYSICIAN ASSISTANT

## 2024-11-18 PROCEDURE — 99214 OFFICE O/P EST MOD 30 MIN: CPT | Performed by: PHYSICIAN ASSISTANT

## 2024-11-18 PROCEDURE — 1036F TOBACCO NON-USER: CPT | Performed by: PHYSICIAN ASSISTANT

## 2024-11-18 PROCEDURE — 90656 IIV3 VACC NO PRSV 0.5 ML IM: CPT | Performed by: PHYSICIAN ASSISTANT

## 2024-11-18 RX ORDER — PANTOPRAZOLE SODIUM 40 MG/1
40 TABLET, DELAYED RELEASE ORAL DAILY
Qty: 90 TABLET | Refills: 1 | Status: SHIPPED | OUTPATIENT
Start: 2024-11-18

## 2024-11-18 RX ORDER — ROSUVASTATIN CALCIUM 10 MG/1
10 TABLET, COATED ORAL DAILY
Qty: 90 TABLET | Refills: 1 | Status: SHIPPED | OUTPATIENT
Start: 2024-11-18

## 2024-11-18 RX ORDER — METFORMIN HYDROCHLORIDE 500 MG/1
1000 TABLET, EXTENDED RELEASE ORAL
Qty: 360 TABLET | Refills: 1 | Status: SHIPPED | OUTPATIENT
Start: 2024-11-18

## 2024-11-18 RX ORDER — FENOFIBRATE 145 MG/1
145 TABLET, FILM COATED ORAL DAILY
Qty: 90 TABLET | Refills: 1 | Status: SHIPPED | OUTPATIENT
Start: 2024-11-18

## 2024-11-18 RX ORDER — BLOOD SUGAR DIAGNOSTIC
1 STRIP MISCELLANEOUS DAILY
Qty: 100 STRIP | Refills: 3 | Status: SHIPPED | OUTPATIENT
Start: 2024-11-18

## 2024-11-18 ASSESSMENT — ENCOUNTER SYMPTOMS
PALPITATIONS: 0
SHORTNESS OF BREATH: 0
ABDOMINAL PAIN: 0

## 2024-11-18 NOTE — PROGRESS NOTES
Subjective   Patient ID: Stoney Snow JR is a 59 y.o. male who presents for Diabetes (Recheck, review blood work), Hypertension, and Hyperlipidemia.    HPI   Patient presents for COPD, CAD, DM,  GERD, and hyperlipidemia.      COPD:  Emphysema seems to be doing well with Breztri. Sees pulmonologist Dr Sarkar 3/12/24.  Has follow up with 3/17/24.     MIKE: Had sleep study that showed MIKE. Dr Sarkar started him on CPAP but hasn't gotten equipment yet. Still tired a lot. Not sleeping well the past year. Has some grandkids that they are caring for and they have been disrupting his sleep.      Hyperlipidemia: LDL good at 67. Trigs improved to 291. Taking and tolerating Rosuvastatin 10mg every day (got aches with atorvastatin) and started fenofibrate after last visit.   LDL Calculated   Date Value   11/11/2024 67 mg/dL   08/21/2024      Comment:     The calculation of LDL and VLDL are inaccurate when the Triglycerides are greater than 400 mg/dL or when the patient is non-fasting. If LDL measurement is necessary contact the testing laboratory for an alternative LDL assay.                                  Near   Borderline      AGE      Desirable  Optimal    High     High     Very High     0-19 Y     0 - 109     ---    110-129   >/= 130     ----    20-24 Y     0 - 119     ---    120-159   >/= 160     ----      >24 Y     0 -  99   100-129  130-159   160-189     >/=190       LDL (mg/dL)   Date Value   09/11/2023 52   06/02/2023 44     Triglycerides (mg/dL)   Date Value   11/11/2024 291 (H)   08/21/2024 523 (H)     HDL-Cholesterol (mg/dL)   Date Value   11/11/2024 35.6   08/21/2024 33.8       IFG: A1c improved to 10.5 with start of Metformin 1000mg daily.  Cut down drinking pop and gatorade after last visit.  Also started Skyrizi for psoriasis earlier this year which has been reported to rarely cause diabetes -- therefore skipped last month's dose for a cycle to see if it helps his sugars.   Having less urinary frequency.    Hemoglobin A1C (%)   Date Value   11/11/2024 10.5 (H)   08/21/2024 12.6 (H)   03/05/2024 6.4 (H)   12/07/2023 6.3 (H)     Glucose (mg/dL)   Date Value   11/11/2024 194 (H)   08/21/2024 390 (H)        Bili returned to normal on labs.  LFTs WNL. No abdominal pain.  Ultrasound showed fatty liver.   ALT (U/L)   Date Value   11/11/2024 14   08/21/2024 18     AST (U/L)   Date Value   11/11/2024 14   08/21/2024 15     Bilirubin, Total (mg/dL)   Date Value   11/11/2024 1.1   08/21/2024 2.8 (H)   03/05/2024 1.7 (H)   12/07/2023 1.7 (H)         HTN: Taking and tolerating Metoprolol every day and Losartan 25mg. BPs have been good.      CAD: sees Dr Clayton. Doing well. No CP or SOB.  V.tach has been controlled.  Sees Dr Clayton -- last appt 10/30/23.  Has follow up 12/9/24. Had NST 12/4/23 that was good.     GERD:  Pantoprazole controlling reflux well. No abdominal pain or breakthrough reflux, unless he misses a dose.      ED: Rarely taking and tolerating Cialis 10mg as needed from urologist. Works well.      Psoriasis: Seeing dermatologist for psoriasis--using clobetasol as needed.   Skyrizi working well (but that is on hold for now).  Started this earlier this year.        Last colonoscopy 7/1/24 with Dr Gardner that showed sessile serrated adenoma and tubular adenomas. Repeat in 7/2025.      reports that he quit smoking about 3 years ago. His smoking use included cigarettes. He started smoking about 41 years ago. He has a 76 pack-year smoking history. He has never used smokeless tobacco.    Review of Systems   Respiratory:  Negative for shortness of breath.    Cardiovascular:  Negative for chest pain and palpitations.   Gastrointestinal:  Negative for abdominal pain.       Objective   BP (!) 110/92   Pulse 84   Temp 35.9 °C (96.7 °F)   Wt 104 kg (229 lb 6.4 oz)   SpO2 98%   BMI 28.67 kg/m²     Physical Exam  HENT:      Head: Normocephalic.   Eyes:      General: No scleral icterus.  Cardiovascular:      Rate and Rhythm:  Normal rate and regular rhythm.   Pulmonary:      Effort: Pulmonary effort is normal.      Breath sounds: Normal breath sounds.   Abdominal:      Palpations: Abdomen is soft. There is no mass.      Tenderness: There is no abdominal tenderness.   Skin:     General: Skin is warm and dry.   Neurological:      Mental Status: He is alert.   Psychiatric:         Mood and Affect: Affect normal.         Assessment/Plan   Diagnoses and all orders for this visit:  Type 2 diabetes mellitus with hyperglycemia, without long-term current use of insulin  -     metFORMIN  mg 24 hr tablet; Take 2 tablets (1,000 mg) by mouth 2 times daily (morning and late afternoon).  -     Lipid Panel; Future  -     Comprehensive Metabolic Panel; Future  -     FreeStyle Test strip; 1 strip once daily.  Benign essential hypertension  -     Comprehensive Metabolic Panel; Future  Hyperlipidemia, unspecified hyperlipidemia type  -     fenofibrate (Tricor) 145 mg tablet; Take 1 tablet (145 mg) by mouth once daily.  -     rosuvastatin (Crestor) 10 mg tablet; Take 1 tablet (10 mg) by mouth once daily.  -     Lipid Panel; Future  -     Comprehensive Metabolic Panel; Future  Coronary artery disease involving native coronary artery of native heart without angina pectoris  -     rosuvastatin (Crestor) 10 mg tablet; Take 1 tablet (10 mg) by mouth once daily.  Gastroesophageal reflux disease without esophagitis  -     pantoprazole (ProtoNix) 40 mg EC tablet; Take 1 tablet (40 mg) by mouth once daily.  Pulmonary emphysema, unspecified emphysema type (Multi)  Ventricular tachycardia (Multi)  Erectile dysfunction, unspecified erectile dysfunction type  Obstructive sleep apnea  Fatty liver  Encounter for immunization  -     Flu vaccine, trivalent, preservative free, age 6 months and greater (Fluraix/Fluzone/Flulaval)         Reviewed labs.   Titrate Rx Metformin XR 500mg up to 2 po bid (titrate up slowly).   Discussed diet and exercise, and encouraged weight  loss.   Refilled meds.   Flu shot given.   See specialists as scheduled.   Stressed importance of getting CPAP to help MIKE (discussed risks of MIKE).    Follow up in 3 months for recheck of DM, HTN, hyperlipidemia, CAD, COPD with fasting labs the week before.

## 2025-03-17 ENCOUNTER — APPOINTMENT (OUTPATIENT)
Dept: PULMONOLOGY | Facility: HOSPITAL | Age: 60
End: 2025-03-17
Payer: COMMERCIAL